# Patient Record
Sex: MALE | Race: OTHER | HISPANIC OR LATINO | URBAN - METROPOLITAN AREA
[De-identification: names, ages, dates, MRNs, and addresses within clinical notes are randomized per-mention and may not be internally consistent; named-entity substitution may affect disease eponyms.]

---

## 2020-12-30 ENCOUNTER — INPATIENT (INPATIENT)
Facility: HOSPITAL | Age: 35
LOS: 0 days | Discharge: HOME | DRG: 310 | End: 2020-12-31
Attending: INTERNAL MEDICINE | Admitting: INTERNAL MEDICINE
Payer: MEDICAID

## 2020-12-30 VITALS
DIASTOLIC BLOOD PRESSURE: 91 MMHG | HEIGHT: 67 IN | OXYGEN SATURATION: 100 % | HEART RATE: 138 BPM | RESPIRATION RATE: 18 BRPM | WEIGHT: 182.1 LBS | SYSTOLIC BLOOD PRESSURE: 143 MMHG

## 2020-12-30 DIAGNOSIS — I48.91 UNSPECIFIED ATRIAL FIBRILLATION: ICD-10-CM

## 2020-12-30 DIAGNOSIS — I10 ESSENTIAL (PRIMARY) HYPERTENSION: ICD-10-CM

## 2020-12-30 DIAGNOSIS — E11.9 TYPE 2 DIABETES MELLITUS WITHOUT COMPLICATIONS: ICD-10-CM

## 2020-12-30 LAB
ALBUMIN SERPL ELPH-MCNC: 4 G/DL — SIGNIFICANT CHANGE UP (ref 3.4–5)
ALP SERPL-CCNC: 150 U/L — HIGH (ref 40–120)
ALT FLD-CCNC: 61 U/L — HIGH (ref 12–42)
AMPHET UR-MCNC: NEGATIVE — SIGNIFICANT CHANGE UP
ANION GAP SERPL CALC-SCNC: 13 MMOL/L — SIGNIFICANT CHANGE UP (ref 9–16)
APTT BLD: 30.2 SEC — SIGNIFICANT CHANGE UP (ref 27.5–35.5)
AST SERPL-CCNC: 45 U/L — HIGH (ref 15–37)
BARBITURATES UR SCN-MCNC: NEGATIVE — SIGNIFICANT CHANGE UP
BASOPHILS # BLD AUTO: 0.06 K/UL — SIGNIFICANT CHANGE UP (ref 0–0.2)
BASOPHILS NFR BLD AUTO: 0.8 % — SIGNIFICANT CHANGE UP (ref 0–2)
BENZODIAZ UR-MCNC: NEGATIVE — SIGNIFICANT CHANGE UP
BILIRUB SERPL-MCNC: 0.2 MG/DL — SIGNIFICANT CHANGE UP (ref 0.2–1.2)
BUN SERPL-MCNC: 11 MG/DL — SIGNIFICANT CHANGE UP (ref 7–23)
CALCIUM SERPL-MCNC: 9.2 MG/DL — SIGNIFICANT CHANGE UP (ref 8.5–10.5)
CHLORIDE SERPL-SCNC: 107 MMOL/L — SIGNIFICANT CHANGE UP (ref 96–108)
CO2 SERPL-SCNC: 19 MMOL/L — LOW (ref 22–31)
COCAINE METAB.OTHER UR-MCNC: NEGATIVE — SIGNIFICANT CHANGE UP
CREAT SERPL-MCNC: 0.93 MG/DL — SIGNIFICANT CHANGE UP (ref 0.5–1.3)
EOSINOPHIL # BLD AUTO: 0.13 K/UL — SIGNIFICANT CHANGE UP (ref 0–0.5)
EOSINOPHIL NFR BLD AUTO: 1.7 % — SIGNIFICANT CHANGE UP (ref 0–6)
GLUCOSE BLDC GLUCOMTR-MCNC: 182 MG/DL — HIGH (ref 70–99)
GLUCOSE SERPL-MCNC: 307 MG/DL — HIGH (ref 70–99)
HCT VFR BLD CALC: 48.9 % — SIGNIFICANT CHANGE UP (ref 39–50)
HGB BLD-MCNC: 16.8 G/DL — SIGNIFICANT CHANGE UP (ref 13–17)
IMM GRANULOCYTES NFR BLD AUTO: 0.3 % — SIGNIFICANT CHANGE UP (ref 0–1.5)
INR BLD: 0.89 — SIGNIFICANT CHANGE UP (ref 0.88–1.16)
LYMPHOCYTES # BLD AUTO: 1.56 K/UL — SIGNIFICANT CHANGE UP (ref 1–3.3)
LYMPHOCYTES # BLD AUTO: 21 % — SIGNIFICANT CHANGE UP (ref 13–44)
MAGNESIUM SERPL-MCNC: 2 MG/DL — SIGNIFICANT CHANGE UP (ref 1.6–2.6)
MCHC RBC-ENTMCNC: 30.2 PG — SIGNIFICANT CHANGE UP (ref 27–34)
MCHC RBC-ENTMCNC: 34.4 GM/DL — SIGNIFICANT CHANGE UP (ref 32–36)
MCV RBC AUTO: 87.8 FL — SIGNIFICANT CHANGE UP (ref 80–100)
METHADONE UR-MCNC: NEGATIVE — SIGNIFICANT CHANGE UP
MONOCYTES # BLD AUTO: 0.42 K/UL — SIGNIFICANT CHANGE UP (ref 0–0.9)
MONOCYTES NFR BLD AUTO: 5.7 % — SIGNIFICANT CHANGE UP (ref 2–14)
NEUTROPHILS # BLD AUTO: 5.24 K/UL — SIGNIFICANT CHANGE UP (ref 1.8–7.4)
NEUTROPHILS NFR BLD AUTO: 70.5 % — SIGNIFICANT CHANGE UP (ref 43–77)
NRBC # BLD: 0 /100 WBCS — SIGNIFICANT CHANGE UP (ref 0–0)
NT-PROBNP SERPL-SCNC: 27 PG/ML — SIGNIFICANT CHANGE UP
OPIATES UR-MCNC: NEGATIVE — SIGNIFICANT CHANGE UP
PCP SPEC-MCNC: SIGNIFICANT CHANGE UP
PCP UR-MCNC: NEGATIVE — SIGNIFICANT CHANGE UP
PLATELET # BLD AUTO: 173 K/UL — SIGNIFICANT CHANGE UP (ref 150–400)
POTASSIUM SERPL-MCNC: 4.4 MMOL/L — SIGNIFICANT CHANGE UP (ref 3.5–5.3)
POTASSIUM SERPL-SCNC: 4.4 MMOL/L — SIGNIFICANT CHANGE UP (ref 3.5–5.3)
PROT SERPL-MCNC: 8.2 G/DL — SIGNIFICANT CHANGE UP (ref 6.4–8.2)
PROTHROM AB SERPL-ACNC: 10.7 SEC — SIGNIFICANT CHANGE UP (ref 10.6–13.6)
RBC # BLD: 5.57 M/UL — SIGNIFICANT CHANGE UP (ref 4.2–5.8)
RBC # FLD: 13.1 % — SIGNIFICANT CHANGE UP (ref 10.3–14.5)
SARS-COV-2 RNA SPEC QL NAA+PROBE: SIGNIFICANT CHANGE UP
SODIUM SERPL-SCNC: 139 MMOL/L — SIGNIFICANT CHANGE UP (ref 132–145)
THC UR QL: NEGATIVE — SIGNIFICANT CHANGE UP
TROPONIN I SERPL-MCNC: 0.02 NG/ML — SIGNIFICANT CHANGE UP (ref 0.02–0.06)
TSH SERPL-MCNC: 1.75 UIU/ML — SIGNIFICANT CHANGE UP (ref 0.36–3.74)
WBC # BLD: 7.43 K/UL — SIGNIFICANT CHANGE UP (ref 3.8–10.5)
WBC # FLD AUTO: 7.43 K/UL — SIGNIFICANT CHANGE UP (ref 3.8–10.5)

## 2020-12-30 PROCEDURE — 71045 X-RAY EXAM CHEST 1 VIEW: CPT | Mod: 26

## 2020-12-30 PROCEDURE — 99285 EMERGENCY DEPT VISIT HI MDM: CPT | Mod: 25

## 2020-12-30 RX ORDER — DILTIAZEM HCL 120 MG
10 CAPSULE, EXT RELEASE 24 HR ORAL ONCE
Refills: 0 | Status: COMPLETED | OUTPATIENT
Start: 2020-12-30 | End: 2020-12-30

## 2020-12-30 RX ORDER — DILTIAZEM HCL 120 MG
60 CAPSULE, EXT RELEASE 24 HR ORAL ONCE
Refills: 0 | Status: COMPLETED | OUTPATIENT
Start: 2020-12-30 | End: 2020-12-30

## 2020-12-30 RX ORDER — INSULIN LISPRO 100/ML
VIAL (ML) SUBCUTANEOUS
Refills: 0 | Status: DISCONTINUED | OUTPATIENT
Start: 2020-12-30 | End: 2020-12-31

## 2020-12-30 RX ORDER — APIXABAN 2.5 MG/1
5 TABLET, FILM COATED ORAL EVERY 12 HOURS
Refills: 0 | Status: DISCONTINUED | OUTPATIENT
Start: 2020-12-31 | End: 2020-12-31

## 2020-12-30 RX ORDER — GLUCAGON INJECTION, SOLUTION 0.5 MG/.1ML
1 INJECTION, SOLUTION SUBCUTANEOUS ONCE
Refills: 0 | Status: DISCONTINUED | OUTPATIENT
Start: 2020-12-30 | End: 2020-12-31

## 2020-12-30 RX ORDER — DEXTROSE 50 % IN WATER 50 %
25 SYRINGE (ML) INTRAVENOUS ONCE
Refills: 0 | Status: DISCONTINUED | OUTPATIENT
Start: 2020-12-30 | End: 2020-12-31

## 2020-12-30 RX ORDER — APIXABAN 2.5 MG/1
5 TABLET, FILM COATED ORAL ONCE
Refills: 0 | Status: COMPLETED | OUTPATIENT
Start: 2020-12-30 | End: 2020-12-30

## 2020-12-30 RX ORDER — DEXTROSE 50 % IN WATER 50 %
12.5 SYRINGE (ML) INTRAVENOUS ONCE
Refills: 0 | Status: DISCONTINUED | OUTPATIENT
Start: 2020-12-30 | End: 2020-12-31

## 2020-12-30 RX ORDER — DILTIAZEM HCL 120 MG
20 CAPSULE, EXT RELEASE 24 HR ORAL ONCE
Refills: 0 | Status: COMPLETED | OUTPATIENT
Start: 2020-12-30 | End: 2020-12-30

## 2020-12-30 RX ORDER — SODIUM CHLORIDE 9 MG/ML
1000 INJECTION, SOLUTION INTRAVENOUS
Refills: 0 | Status: DISCONTINUED | OUTPATIENT
Start: 2020-12-30 | End: 2020-12-31

## 2020-12-30 RX ORDER — DILTIAZEM HCL 120 MG
5 CAPSULE, EXT RELEASE 24 HR ORAL
Qty: 125 | Refills: 0 | Status: DISCONTINUED | OUTPATIENT
Start: 2020-12-30 | End: 2020-12-30

## 2020-12-30 RX ORDER — DEXTROSE 50 % IN WATER 50 %
15 SYRINGE (ML) INTRAVENOUS ONCE
Refills: 0 | Status: DISCONTINUED | OUTPATIENT
Start: 2020-12-30 | End: 2020-12-31

## 2020-12-30 RX ORDER — METOPROLOL TARTRATE 50 MG
25 TABLET ORAL EVERY 6 HOURS
Refills: 0 | Status: DISCONTINUED | OUTPATIENT
Start: 2020-12-30 | End: 2020-12-31

## 2020-12-30 RX ORDER — INFLUENZA VIRUS VACCINE 15; 15; 15; 15 UG/.5ML; UG/.5ML; UG/.5ML; UG/.5ML
0.5 SUSPENSION INTRAMUSCULAR ONCE
Refills: 0 | Status: DISCONTINUED | OUTPATIENT
Start: 2020-12-30 | End: 2020-12-31

## 2020-12-30 RX ORDER — METOPROLOL TARTRATE 50 MG
5 TABLET ORAL ONCE
Refills: 0 | Status: COMPLETED | OUTPATIENT
Start: 2020-12-30 | End: 2020-12-30

## 2020-12-30 RX ADMIN — Medication 20 MILLIGRAM(S): at 16:20

## 2020-12-30 RX ADMIN — Medication 25 MILLIGRAM(S): at 20:29

## 2020-12-30 RX ADMIN — APIXABAN 5 MILLIGRAM(S): 2.5 TABLET, FILM COATED ORAL at 18:08

## 2020-12-30 RX ADMIN — Medication 10 MILLIGRAM(S): at 17:52

## 2020-12-30 RX ADMIN — Medication 5 MILLIGRAM(S): at 20:00

## 2020-12-30 RX ADMIN — Medication 60 MILLIGRAM(S): at 16:21

## 2020-12-30 RX ADMIN — Medication 5 MG/HR: at 18:03

## 2020-12-30 RX ADMIN — Medication 2: at 22:21

## 2020-12-30 NOTE — H&P ADULT - HISTORY OF PRESENT ILLNESS
Patient is a 36 y/o male with PMHx of paroxysmal a-fib (on warfarin, last dose 2 weeks ago), HTN, and DM II who presented to [facility] ED 12/30/2020 c/o palpitations and CP. He states symptoms started 40 minutes prior to arrival to ED. He reports that symptoms occurred acutely, and were similar to prior a-fib episodes. He explains that palpitations are associated with upper left sided chest pressure radiating to left shoulder. Since palpitations started patient reports dizziness, lightheadedness, and generalized weakness. When symptoms did not resolve he called EMS, and EMS found patient in rapid atrial fibrillation, and was given PO aspirin 162mg x1 and diltiazem 21mg POx1. Per ED attending report that HR improved from 160-140s. In addition, patient complains of worsening SOB which occurs at rest, and is worsened with exertion. In ED VS /9, , RR 18, O2 100%, EKG atrial fibrillation @ 129, no ST changes, CXR unremarkable, Labs trops x1 negative, COVID negative, INR 0.89, TSH WNL. In ED patient given Eliquis 5mg POx1, Diltiazem 60mg POx1, IV Diltiazem 20mg x1, and IV Diltiazem 10mg x1, with HR only improving to 130s, and subsequently started on diltiazem gtt.     Patient is now admitted to cardiology service for management of rapid atrial fibrillation.    Hx obtained via  [place] [id number]    Patient is a 34 y/o Yor[place] SPEAKING male with PMHx of paroxysmal a-fib (on warfarin, last dose 2 weeks ago), HTN, and DM II who presented to [facility] ED 12/30/2020 c/o palpitations and CP. He states symptoms started 40 minutes prior to arrival to ED. He reports that symptoms occurred acutely, and were similar to prior a-fib episodes. He explains that palpitations are associated with upper left sided chest pressure radiating to left shoulder. Since palpitations started patient reports dizziness, lightheadedness, and generalized weakness. When symptoms did not resolve he called EMS, and EMS found patient in rapid atrial fibrillation, and was given PO aspirin 162mg x1 and diltiazem 21mg POx1. Per ED attending report that HR improved from 160-140s. In addition, patient complains of worsening SOB which occurs at rest, and is worsened with exertion. In ED VS /9, , RR 18, O2 100%, EKG atrial fibrillation @ 129, no ST changes, CXR unremarkable, Labs trops x1 negative, COVID negative, INR 0.89, TSH WNL. In ED patient given Eliquis 5mg POx1, Diltiazem 60mg POx1, IV Diltiazem 20mg x1, and IV Diltiazem 10mg x1, with HR only improving to 130s, and subsequently started on diltiazem gtt.     Patient is now admitted to cardiology service for management of rapid atrial fibrillation.

## 2020-12-30 NOTE — H&P ADULT - ATTENDING COMMENTS
Initial attending contact date 12/31 on morning bedside rounds. See PA note written above, for details. I reviewed the PA documentation.  I have personally seen and examined this patient today. I reviewed vitals, labs, medications, cardiac studies and additional imaging.  I agree with the PA's findings and plans as written above with the following additions/amendments:  Patient with persistent rapid Afib   NPO for PORSHA/DCCV and EP c/s  Cont BB and Eliquis  TTE also ordered  Anticipate home 12/31 if patient has escort  Home 1/1 if patient without escort  AMARILYS Weir.  Cardiology Attending

## 2020-12-30 NOTE — ED PROVIDER NOTE - OBJECTIVE STATEMENT
Pt is a 36yo M with a h/o AF (on warfarin), HTN, and NIDDM who p/w palpitations and chest pressure ~40 minutes PTA.  Pt reports acute onset and felt similar to episodes of AF.  Denies any inciting factors.  Reports taking all of his medications today (warfarin, lisinopril, carvedilol, metformin, and vitamins).  Palpitations associated with upper L chest pain/pressure - radiates to shoulder.  Pt called EMS and EMS found pt in rapid AF.  They gave 162mg of aspirin and 21mg of diltiazem.  They report HR came down from 160s to 140s.  Currently in 130s upon arrival. Pt is a 36yo M with a h/o PAF (on warfarin), HTN, and NIDDM who p/w palpitations and chest pressure ~40 minutes PTA.  Pt reports acute onset and felt similar to episodes of AF.  Denies any inciting factors.  Reports taking all of his medications today (warfarin, lisinopril, carvedilol, metformin, and vitamins).  Palpitations associated with upper L chest pain/pressure - radiates to shoulder.  Pt called EMS and EMS found pt in rapid AF.  They gave 162mg of aspirin and 21mg of diltiazem.  They report HR came down from 160s to 140s.  Currently in 130s upon arrival.  Pt reports feeling SOB even at rest, worse with exertion.  Also feeling dizzy and describes as light headed.  Pt also reports generalized weakness since the palpitations started.    +EtOH use but reports moderate and only on weekends.  Denies any drug use or tobacco use.

## 2020-12-30 NOTE — ED ADULT TRIAGE NOTE - CHIEF COMPLAINT QUOTE
Pt BIBEMS complaining of chest pain. Pt found to be in rapid afib by EMS. Pt received 21mg of Cardizem and 162 of aspirin by EMS.

## 2020-12-30 NOTE — ED PROVIDER NOTE - PROGRESS NOTE DETAILS
I discussed the INR results with patient and he admits he forgets to take his warfarin and in fact ahsn;t taken it in 2 weeks. I discussed case with out cardiologist, Dr. Joseph.  He recommends continuing treatment with Diltiazem and giving additional bolus of 10mg and starting a drip.  He also recommends admission given pt's sxs and need for possible heparin. DR. Hunter of cardiology at [facility] paged via CTC to discuss case. DR. Hunter of cardiology at [facility] paged via CTC to discuss case.  Dr. Hunter accepts pt to cardiac tele and would like to start eliquis and keep pt NPO after midnight for possible cardioversion in AM.

## 2020-12-30 NOTE — ED PROVIDER NOTE - CLINICAL SUMMARY MEDICAL DECISION MAKING FREE TEXT BOX
VERONICA.  IV, labs, monitor.  Will give additional dose of Cardizem both IV and PO.  Will consult cardiology as needed.

## 2020-12-30 NOTE — ED ADULT TRIAGE NOTE - NS ED TRIAGE CLINICAL UPGRADE
I have placed referral for diabetic educator and nutrition therapy   
Patient would like a return call by Diabetic Educator.  Please call patient at [phone].  
Spoke with patient.  Patient is wondering if he could get the [employer] personal CGM.  Explained the basics of the [employer] Personal CGM, patient is very interested.  Will discuss with patient how to get this system at appointment on Tuesday March 6th.  Patient also wondering if he could get an appointment sooner than his current scheduled appointment with diabetes ed on March 6th at 11:00 am.  Patient would like to come to [place] or [place].  Explained that these clinics don't have any openings before next Tuesday, offered to look at other clinics, but patient declined.      All questions/concerns answered.    Hafsa Spring, KADEN, LD    
Deteriorating patient status - Patient was clinically upgraded due to deteriorating patient status.

## 2020-12-30 NOTE — ED PROVIDER NOTE - NS_EDPROVIDERDISPOUSERTYPE_ED_A_ED
Attending Attestation (For Attendings USE Only)... Retention Suture Text: Retention sutures were placed to support the closure and prevent dehiscence.

## 2020-12-30 NOTE — ED ADULT NURSE NOTE - NSIMPLEMENTINTERV_GEN_ALL_ED
Implemented All Universal Safety Interventions:  [place] to call system. Call bell, personal items and telephone within reach. Instruct patient to call for assistance. Room bathroom lighting operational. Non-slip footwear when patient is off stretcher. Physically safe environment: no spills, clutter or unnecessary equipment. Stretcher in lowest position, wheels locked, appropriate side rails in place.

## 2020-12-30 NOTE — H&P ADULT - ASSESSMENT
Patient is a 34 y/o male with PMHx of paroxysmal a-fib (on warfarin, last dose 2 weeks ago), HTN, and DM II who presented to [facility] ED 12/30/2020 c/o palpitations and CP. He is now admitted to cardiology service for management of rapid atrial fibrillation.

## 2020-12-30 NOTE — H&P ADULT - NSHPLABSRESULTS_GEN_ALL_CORE
EKG: atrial fibrillation @ 129bpm, no ST changes                          16.8   7.43  )-----------( 173      ( 30 Dec 2020 16:25 )             48.9       12-30    139  |  107  |  11  ----------------------------<  307<H>  4.4   |  19<L>  |  0.93    Ca    9.2      30 Dec 2020 16:25  Mg     2.0     12-30    TPro  8.2  /  Alb  4.0  /  TBili  0.2  /  DBili  x   /  AST  45<H>  /  ALT  61<H>  /  AlkPhos  150<H>  12-30      PT/INR - ( 30 Dec 2020 16:25 )   PT: 10.7 sec;   INR: 0.89          PTT - ( 30 Dec 2020 16:25 )  PTT:30.2 sec

## 2020-12-30 NOTE — ED ADULT NURSE REASSESSMENT NOTE - NS ED NURSE REASSESS COMMENT FT1
Pt complaining of continued chest palpitations. Pt remains on continuos cardiac monitoring. Pt remains in afib with hr ranging from . COVID test sent to lab. Will continue to monitor.

## 2020-12-30 NOTE — H&P ADULT - PROBLEM SELECTOR PLAN 1
-Currently 's, EKG revealed atrial fibrillation @ 129bpm, no ischemic changes  -CXR unremarkable, TSH WNL, Trops x1 negative  -In ED given Eliquis 5mg POx1, Diltiazem 60mg POx1, IV Diltiazem 20mg x1, and IV Diltiazem 10mg x1, with HR only improving to 130s, and subsequently started on diltiazem gtt  -Patient reports noncompliance to warfarin at home. Last taken 2 weeks ago, INR subtherapeutic. Started on Eliquis 5mg BID   -continue diltiazem gtt  -Echo ordered  -NPO after MN for PORSHA/DCCV, consult EP in AM -Currently 's, EKG revealed atrial fibrillation @ 129bpm, no ischemic changes  -CXR unremarkable, TSH WNL, Trops x1 negative  -In ED given Eliquis 5mg POx1, Diltiazem 60mg POx1, IV Diltiazem 20mg x1, and IV Diltiazem 10mg x1, with HR only improving to 130s, and subsequently started on diltiazem gtt  -Patient reports noncompliance to warfarin at home. Last taken 2 weeks ago, INR subtherapeutic. Started on Eliquis 5mg BID   -continue diltiazem gtt  -Echo ordered  -NPO after MN for PORSHA/DCCV, consult EP in AM  -UTOX negative -Currently 's, EKG revealed atrial fibrillation @ 129bpm, no ischemic changes  -CXR unremarkable, TSH WNL, Trops x1 negative  -In ED given Eliquis 5mg POx1, Diltiazem 60mg POx1, IV Diltiazem 20mg x1, and IV Diltiazem 10mg x1, with HR only improving to 130s, and subsequently started on diltiazem gtt  -Patient reports noncompliance to warfarin at home. Last taken 2 weeks ago, INR subtherapeutic. Started on Eliquis 5mg BID   -continue diltiazem gtt  -Echo ordered  -NPO after MN for PORSHA/DCCV, consult EP in AM  -UTOX negative  -monitor on tele -Currently 's, EKG revealed atrial fibrillation @ 129bpm, no ischemic changes  -CXR unremarkable, TSH WNL, Trops x1 negative  -In ED given Eliquis 5mg POx1, Diltiazem 60mg POx1, IV Diltiazem 20mg x1, and IV Diltiazem 10mg x1, with HR only improving to 130s, and subsequently started on diltiazem gtt  -Patient reports noncompliance to warfarin at home. Last taken 2 weeks ago, INR subtherapeutic. Started on Eliquis 5mg BID   -diltiazem gtt discontinued and started on Lopressor 25mg PO q6h  -Echo ordered  -NPO after MN for PORSHA/DCCV, consult EP in AM  -UTOX negative  -monitor on tele

## 2020-12-30 NOTE — ED ADULT NURSE NOTE - OBJECTIVE STATEMENT
Pt is a 35y male complaining of sudden of chest pain and palpitations radiating to left side. Pt was found to be in rapid afib by EMS and was given Cardizem 21mg and 162 of aspirin prior to arrival. Pt denies sob.

## 2020-12-30 NOTE — ED PROVIDER NOTE - PHYSICAL EXAMINATION
VITAL SIGNS: I have reviewed nursing notes and confirm.  CONSTITUTIONAL: Well-developed; well-nourished; in no acute distress.  SKIN: Skin is warm and dry, no acute rash.  HEAD: Normocephalic; atraumatic.  EYES: PERRL, EOM intact; conjunctiva and sclera clear.  ENT: No nasal discharge; airway clear.  NECK: Supple; non tender.  CARD: +Irregularly irregular tachycardia.  No murmurs.   RESP: No wheezes, rales or rhonchi.  ABD: Normal bowel sounds; soft; non-distended; non-tender; no hepatosplenomegaly.  MSK: Normal ROM. No clubbing, cyanosis or edema.  NEURO: Alert, oriented. Grossly unremarkable.  PSYCH: Cooperative, appropriate. VITAL SIGNS: I have reviewed nursing notes and confirm.  CONSTITUTIONAL: Well-developed; well-nourished; appears uncomfortable.   SKIN: Skin is warm and dry, no acute rash.  HEAD: Normocephalic; atraumatic.  EYES: PERRL, EOM intact; conjunctiva and sclera clear.  ENT: No nasal discharge; airway clear.  NECK: Supple; non tender.  CARD: +Irregularly irregular tachycardia.  No murmurs.   RESP: No wheezes, rales or rhonchi.  ABD: Normal bowel sounds; soft; non-distended; non-tender; no hepatosplenomegaly.  MSK: Normal ROM. No clubbing, cyanosis or edema.  NEURO: Alert, oriented. Grossly unremarkable.  PSYCH: Cooperative, appropriate.

## 2020-12-31 ENCOUNTER — TRANSCRIPTION ENCOUNTER (OUTPATIENT)
Age: 35
End: 2020-12-31

## 2020-12-31 VITALS
DIASTOLIC BLOOD PRESSURE: 78 MMHG | HEART RATE: 89 BPM | OXYGEN SATURATION: 99 % | SYSTOLIC BLOOD PRESSURE: 111 MMHG | RESPIRATION RATE: 18 BRPM

## 2020-12-31 LAB
A1C WITH ESTIMATED AVERAGE GLUCOSE RESULT: 7.9 % — HIGH (ref 4–5.6)
ALBUMIN SERPL ELPH-MCNC: 4.2 G/DL — SIGNIFICANT CHANGE UP (ref 3.3–5)
ALP SERPL-CCNC: 100 U/L — SIGNIFICANT CHANGE UP (ref 40–120)
ALT FLD-CCNC: 38 U/L — SIGNIFICANT CHANGE UP (ref 10–45)
ANION GAP SERPL CALC-SCNC: 13 MMOL/L — SIGNIFICANT CHANGE UP (ref 5–17)
AST SERPL-CCNC: 27 U/L — SIGNIFICANT CHANGE UP (ref 10–40)
BASOPHILS # BLD AUTO: 0.09 K/UL — SIGNIFICANT CHANGE UP (ref 0–0.2)
BASOPHILS NFR BLD AUTO: 0.9 % — SIGNIFICANT CHANGE UP (ref 0–2)
BILIRUB SERPL-MCNC: 0.6 MG/DL — SIGNIFICANT CHANGE UP (ref 0.2–1.2)
BUN SERPL-MCNC: 13 MG/DL — SIGNIFICANT CHANGE UP (ref 7–23)
CALCIUM SERPL-MCNC: 9.4 MG/DL — SIGNIFICANT CHANGE UP (ref 8.4–10.5)
CHLORIDE SERPL-SCNC: 105 MMOL/L — SIGNIFICANT CHANGE UP (ref 96–108)
CHOLEST SERPL-MCNC: 219 MG/DL — HIGH
CO2 SERPL-SCNC: 22 MMOL/L — SIGNIFICANT CHANGE UP (ref 22–31)
CREAT SERPL-MCNC: 0.61 MG/DL — SIGNIFICANT CHANGE UP (ref 0.5–1.3)
EOSINOPHIL # BLD AUTO: 0.17 K/UL — SIGNIFICANT CHANGE UP (ref 0–0.5)
EOSINOPHIL NFR BLD AUTO: 1.7 % — SIGNIFICANT CHANGE UP (ref 0–6)
ESTIMATED AVERAGE GLUCOSE: 180 MG/DL — HIGH (ref 68–114)
GLUCOSE BLDC GLUCOMTR-MCNC: 161 MG/DL — HIGH (ref 70–99)
GLUCOSE BLDC GLUCOMTR-MCNC: 167 MG/DL — HIGH (ref 70–99)
GLUCOSE BLDC GLUCOMTR-MCNC: 172 MG/DL — HIGH (ref 70–99)
GLUCOSE SERPL-MCNC: 187 MG/DL — HIGH (ref 70–99)
HCT VFR BLD CALC: 52.1 % — HIGH (ref 39–50)
HDLC SERPL-MCNC: 46 MG/DL — SIGNIFICANT CHANGE UP
HGB BLD-MCNC: 17.2 G/DL — HIGH (ref 13–17)
IMM GRANULOCYTES NFR BLD AUTO: 0.3 % — SIGNIFICANT CHANGE UP (ref 0–1.5)
LIPID PNL WITH DIRECT LDL SERPL: 131 MG/DL — HIGH
LYMPHOCYTES # BLD AUTO: 3.12 K/UL — SIGNIFICANT CHANGE UP (ref 1–3.3)
LYMPHOCYTES # BLD AUTO: 32 % — SIGNIFICANT CHANGE UP (ref 13–44)
MAGNESIUM SERPL-MCNC: 2.1 MG/DL — SIGNIFICANT CHANGE UP (ref 1.6–2.6)
MCHC RBC-ENTMCNC: 29.1 PG — SIGNIFICANT CHANGE UP (ref 27–34)
MCHC RBC-ENTMCNC: 33 GM/DL — SIGNIFICANT CHANGE UP (ref 32–36)
MCV RBC AUTO: 88 FL — SIGNIFICANT CHANGE UP (ref 80–100)
MONOCYTES # BLD AUTO: 0.75 K/UL — SIGNIFICANT CHANGE UP (ref 0–0.9)
MONOCYTES NFR BLD AUTO: 7.7 % — SIGNIFICANT CHANGE UP (ref 2–14)
NEUTROPHILS # BLD AUTO: 5.59 K/UL — SIGNIFICANT CHANGE UP (ref 1.8–7.4)
NEUTROPHILS NFR BLD AUTO: 57.4 % — SIGNIFICANT CHANGE UP (ref 43–77)
NON HDL CHOLESTEROL: 173 MG/DL — HIGH
NRBC # BLD: 0 /100 WBCS — SIGNIFICANT CHANGE UP (ref 0–0)
PLATELET # BLD AUTO: 181 K/UL — SIGNIFICANT CHANGE UP (ref 150–400)
POTASSIUM SERPL-MCNC: 3.3 MMOL/L — LOW (ref 3.5–5.3)
POTASSIUM SERPL-SCNC: 3.3 MMOL/L — LOW (ref 3.5–5.3)
PROT SERPL-MCNC: 7 G/DL — SIGNIFICANT CHANGE UP (ref 6–8.3)
RBC # BLD: 5.92 M/UL — HIGH (ref 4.2–5.8)
RBC # FLD: 13.2 % — SIGNIFICANT CHANGE UP (ref 10.3–14.5)
SODIUM SERPL-SCNC: 140 MMOL/L — SIGNIFICANT CHANGE UP (ref 135–145)
TRIGL SERPL-MCNC: 210 MG/DL — HIGH
WBC # BLD: 9.75 K/UL — SIGNIFICANT CHANGE UP (ref 3.8–10.5)
WBC # FLD AUTO: 9.75 K/UL — SIGNIFICANT CHANGE UP (ref 3.8–10.5)

## 2020-12-31 PROCEDURE — 93312 ECHO TRANSESOPHAGEAL: CPT | Mod: 26

## 2020-12-31 PROCEDURE — 84443 ASSAY THYROID STIM HORMONE: CPT

## 2020-12-31 PROCEDURE — 99239 HOSP IP/OBS DSCHRG MGMT >30: CPT

## 2020-12-31 PROCEDURE — 82962 GLUCOSE BLOOD TEST: CPT

## 2020-12-31 PROCEDURE — 85025 COMPLETE CBC W/AUTO DIFF WBC: CPT

## 2020-12-31 PROCEDURE — 87635 SARS-COV-2 COVID-19 AMP PRB: CPT

## 2020-12-31 PROCEDURE — 83036 HEMOGLOBIN GLYCOSYLATED A1C: CPT

## 2020-12-31 PROCEDURE — 86769 SARS-COV-2 COVID-19 ANTIBODY: CPT

## 2020-12-31 PROCEDURE — 85730 THROMBOPLASTIN TIME PARTIAL: CPT

## 2020-12-31 PROCEDURE — 93306 TTE W/DOPPLER COMPLETE: CPT | Mod: 26

## 2020-12-31 PROCEDURE — 36415 COLL VENOUS BLD VENIPUNCTURE: CPT

## 2020-12-31 PROCEDURE — 80307 DRUG TEST PRSMV CHEM ANLYZR: CPT

## 2020-12-31 PROCEDURE — 92960 CARDIOVERSION ELECTRIC EXT: CPT

## 2020-12-31 PROCEDURE — 96374 THER/PROPH/DIAG INJ IV PUSH: CPT

## 2020-12-31 PROCEDURE — 93306 TTE W/DOPPLER COMPLETE: CPT

## 2020-12-31 PROCEDURE — 85610 PROTHROMBIN TIME: CPT

## 2020-12-31 PROCEDURE — 99285 EMERGENCY DEPT VISIT HI MDM: CPT | Mod: 25

## 2020-12-31 PROCEDURE — 96376 TX/PRO/DX INJ SAME DRUG ADON: CPT

## 2020-12-31 PROCEDURE — 93312 ECHO TRANSESOPHAGEAL: CPT

## 2020-12-31 PROCEDURE — 99255 IP/OBS CONSLTJ NEW/EST HI 80: CPT

## 2020-12-31 PROCEDURE — 84484 ASSAY OF TROPONIN QUANT: CPT

## 2020-12-31 PROCEDURE — 80061 LIPID PANEL: CPT

## 2020-12-31 PROCEDURE — 83735 ASSAY OF MAGNESIUM: CPT

## 2020-12-31 PROCEDURE — 83880 ASSAY OF NATRIURETIC PEPTIDE: CPT

## 2020-12-31 PROCEDURE — 80053 COMPREHEN METABOLIC PANEL: CPT

## 2020-12-31 PROCEDURE — 93321 DOPPLER ECHO F-UP/LMTD STD: CPT | Mod: 26,59

## 2020-12-31 PROCEDURE — 71045 X-RAY EXAM CHEST 1 VIEW: CPT

## 2020-12-31 RX ORDER — METOPROLOL TARTRATE 50 MG
1 TABLET ORAL
Qty: 30 | Refills: 2
Start: 2020-12-31 | End: 2021-03-30

## 2020-12-31 RX ORDER — APIXABAN 2.5 MG/1
1 TABLET, FILM COATED ORAL
Qty: 60 | Refills: 2
Start: 2020-12-31 | End: 2021-03-30

## 2020-12-31 RX ORDER — ATORVASTATIN CALCIUM 80 MG/1
40 TABLET, FILM COATED ORAL AT BEDTIME
Refills: 0 | Status: DISCONTINUED | OUTPATIENT
Start: 2020-12-31 | End: 2020-12-31

## 2020-12-31 RX ORDER — METOPROLOL TARTRATE 50 MG
5 TABLET ORAL ONCE
Refills: 0 | Status: COMPLETED | OUTPATIENT
Start: 2020-12-31 | End: 2020-12-31

## 2020-12-31 RX ORDER — POTASSIUM CHLORIDE 20 MEQ
40 PACKET (EA) ORAL ONCE
Refills: 0 | Status: COMPLETED | OUTPATIENT
Start: 2020-12-31 | End: 2020-12-31

## 2020-12-31 RX ORDER — METOPROLOL TARTRATE 50 MG
1 TABLET ORAL
Qty: 7 | Refills: 0
Start: 2020-12-31 | End: 2021-01-06

## 2020-12-31 RX ORDER — ATORVASTATIN CALCIUM 80 MG/1
1 TABLET, FILM COATED ORAL
Qty: 7 | Refills: 0
Start: 2020-12-31 | End: 2021-01-06

## 2020-12-31 RX ORDER — ATORVASTATIN CALCIUM 80 MG/1
1 TABLET, FILM COATED ORAL
Qty: 30 | Refills: 2
Start: 2020-12-31 | End: 2021-03-30

## 2020-12-31 RX ORDER — POTASSIUM CHLORIDE 20 MEQ
20 PACKET (EA) ORAL ONCE
Refills: 0 | Status: COMPLETED | OUTPATIENT
Start: 2020-12-31 | End: 2020-12-31

## 2020-12-31 RX ORDER — CARVEDILOL PHOSPHATE 80 MG/1
1 CAPSULE, EXTENDED RELEASE ORAL
Qty: 0 | Refills: 0 | DISCHARGE

## 2020-12-31 RX ORDER — APIXABAN 2.5 MG/1
1 TABLET, FILM COATED ORAL
Qty: 60 | Refills: 0
Start: 2020-12-31 | End: 2021-01-29

## 2020-12-31 RX ORDER — WARFARIN SODIUM 2.5 MG/1
0 TABLET ORAL
Qty: 0 | Refills: 0 | DISCHARGE

## 2020-12-31 RX ADMIN — Medication 5 MILLIGRAM(S): at 02:34

## 2020-12-31 RX ADMIN — Medication 40 MILLIEQUIVALENT(S): at 08:23

## 2020-12-31 RX ADMIN — Medication 2: at 07:35

## 2020-12-31 RX ADMIN — Medication 25 MILLIGRAM(S): at 05:21

## 2020-12-31 RX ADMIN — APIXABAN 5 MILLIGRAM(S): 2.5 TABLET, FILM COATED ORAL at 05:22

## 2020-12-31 RX ADMIN — Medication 20 MILLIEQUIVALENT(S): at 08:23

## 2020-12-31 RX ADMIN — Medication 25 MILLIGRAM(S): at 01:57

## 2020-12-31 RX ADMIN — Medication 5 MILLIGRAM(S): at 05:33

## 2020-12-31 NOTE — CONSULT NOTE ADULT - SUBJECTIVE AND OBJECTIVE BOX
CHIEF COMPLAINT: Palpitations    HISTORY OF PRESENT ILLNESS:    34 y/o Stateless SPEAKING male with PMHx of paroxysmal a-fib (on warfarin, last dose 2 weeks ago), HTN, and DM II who presented to [facility] ED 12/30/2020 c/o palpitations and CP. He states symptoms started 40 minutes prior to arrival to ED. He reports that symptoms occurred acutely, and were similar to prior a-fib episodes. He explains that palpitations are associated with upper left sided chest pressure radiating to left shoulder. Since palpitations started patient reports dizziness, lightheadedness, and generalized weakness. When symptoms did not resolve he called EMS, and EMS found patient in rapid atrial fibrillation, and was given PO aspirin 162mg x1 and diltiazem 21mg POx1. Per ED attending report that HR improved from 160-140s. In addition, patient complains of worsening SOB which occurs at rest, and is worsened with exertion. In ED VS /9, , RR 18, O2 100%, EKG atrial fibrillation @ 129, no ST changes, CXR unremarkable, Labs trops x1 negative, COVID negative, INR 0.89, TSH WNL. In ED patient given Eliquis 5mg POx1, Diltiazem 60mg POx1, IV Diltiazem 20mg x1, and IV Diltiazem 10mg x1, with HR only improving to 130s, and subsequently started on diltiazem gtt.    Patient reports he was diagnosed with atrial fibrillation around the same time last year, s/p DCCV at OSH.  He has been maintained on Warfarin since that time, but reports that sometimes his "blood is too thick".  He denies any episodes of palpitations until yesterday when he had sudden onset rapid heart action and chest pressure.  He remains in atrial fibrillation with -150s at rest.  He has been NPO since midnight awaiting PORSHA/DCCV.      Of note, he endorses having a history of snoring but has not had a formal sleep study.      PAST MEDICAL & SURGICAL HISTORY:  Diabetes mellitus    Atrial fibrillation    Essential hypertension    No significant past surgical history          PERTINENT DIAGNOSTIC TESTING:    Echo pending      Allergies    No Known Allergies    Intolerances    	    MEDICATIONS:  metoprolol tartrate 25 milliGRAM(s) Oral every 6 hours  dextrose 40% Gel 15 Gram(s) Oral once  dextrose 50% Injectable 25 Gram(s) IV Push once  dextrose 50% Injectable 12.5 Gram(s) IV Push once  dextrose 50% Injectable 25 Gram(s) IV Push once  glucagon  Injectable 1 milliGRAM(s) IntraMuscular once  insulin lispro (ADMELOG) corrective regimen sliding scale   SubCutaneous Before meals and at bedtime  apixaban 5 milliGRAM(s) Oral every 12 hours  dextrose 5%. 1000 milliLiter(s) IV Continuous <Continuous>  dextrose 5%. 1000 milliLiter(s) IV Continuous <Continuous>  influenza   Vaccine 0.5 milliLiter(s) IntraMuscular once      FAMILY HISTORY:  No pertinent family history in first degree relatives        SOCIAL HISTORY:    [x ] Non-smoker  [ ] Smoker  [ ] Alcohol - occasional ETOH use        REVIEW OF SYSTEMS:    CONSTITUTIONAL: No fever, weight loss, or fatigue  EYES: No eye pain, visual disturbances, or discharge  ENMT:  No difficulty hearing, tinnitus, vertigo; No sinus or throat pain  NECK: No pain or stiffness  BREASTS: No pain, masses, or nipple discharge  RESPIRATORY: No cough, wheezing, chills or hemoptysis; No Shortness of Breath  CARDIOVASCULAR: + palpitations, chest pressure  GASTROINTESTINAL: No abdominal or epigastric pain. No nausea, vomiting, or hematemesis; No diarrhea or constipation. No melena or hematochezia.  GENITOURINARY: No dysuria, frequency, hematuria, or incontinence  NEUROLOGICAL: No headaches, memory loss, loss of strength, numbness, or tremors  SKIN: No itching, burning, rashes, or lesions   LYMPH Nodes: No enlarged glands  ENDOCRINE: No heat or cold intolerance; No hair loss  MUSCULOSKELETAL: No joint pain or swelling; No muscle, back, or extremity pain  PSYCHIATRIC: No depression, anxiety, mood swings, or difficulty sleeping  HEME/LYMPH: No easy bruising, or bleeding gums  ALLERY AND IMMUNOLOGIC: No hives or eczema	    [x ] All others negative	  [ ] Unable to obtain    PHYSICAL EXAM:  T(C): 36.4 (12-31-20 @ 10:12), Max: 37.1 (12-30-20 @ 22:23)  HR: 154 (12-31-20 @ 08:22) (89 - 157)  BP: 112/67 (12-31-20 @ 08:22) (107/68 - 160/118)  RR: 18 (12-31-20 @ 08:22) (18 - 18)  SpO2: 99% (12-31-20 @ 08:22) (95% - 100%)  Wt(kg): --  I&O's Summary    30 Dec 2020 07:01  -  31 Dec 2020 07:00  --------------------------------------------------------  IN: 100 mL / OUT: 350 mL / NET: -250 mL    31 Dec 2020 07:01  -  31 Dec 2020 10:16  --------------------------------------------------------  IN: 0 mL / OUT: 0 mL / NET: 0 mL        TELEMETRY: 	  AFib with -150s  ECG:     Appearance: Normal	  HEENT:   Normal oral mucosa, PERRL, EOMI	  Cardiovascular: s1s2 irreg irreg tachy  Respiratory: Lungs clear to auscultation	  Gastrointestinal:  Soft, Non-tender, + BS	  Neurologic: A&O x 3, Non-focal  Extremities: Normal range of motion, No clubbing, cyanosis or edema  Vascular: Peripheral pulses palpable 2+ bilaterally    	  LABS:	 	    CARDIAC MARKERS:  Troponin I, Serum: 0.022 ng/mL (12-30 @ 16:25)                                  17.2   9.75  )-----------( 181      ( 31 Dec 2020 06:33 )             52.1     12-31    140  |  105  |  13  ----------------------------<  187<H>  3.3<L>   |  22  |  0.61    Ca    9.4      31 Dec 2020 06:33  Mg     2.1     12-31    TPro  7.0  /  Alb  4.2  /  TBili  0.6  /  DBili  x   /  AST  27  /  ALT  38  /  AlkPhos  100  12-31    proBNP: Serum Pro-Brain Natriuretic Peptide: 27 pg/mL (12-30 @ 16:25)    Lipid Profile:   HgA1c:   TSH: Thyroid Stimulating Hormone, Serum: 1.752 uIU/mL (12-30 @ 16:25)      ASSESSMENT/PLAN: 	  34 y/o M with h/o DM, HTN and symptomatic atrial fibrillation with RVR.  Patient remains in rapid AFib despite attempts at rate control.  Would proceed with PORSHA/DCCV for restoration of SR.  Continue oral anticoagulation- Eliquis 5 mg BID preferably if insurance will cover.  Would recommend ROSE evaluation given h/o snoring.

## 2020-12-31 NOTE — DISCHARGE NOTE PROVIDER - HOSPITAL COURSE
Patient is a 36 y/o male with PMHx of paroxysmal a-fib (on warfarin, last dose 2 weeks ago), HTN, and DM II who presented to [facility] ED 12/30/2020 c/o palpitations and CP. He states symptoms started 40 minutes prior to arrival to ED. He reports that symptoms occurred acutely, and were similar to prior a-fib episodes. He explains that palpitations are associated with upper left sided chest pressure radiating to left shoulder. Since palpitations started patient reports dizziness, lightheadedness, and generalized weakness. When symptoms did not resolve he called EMS, and EMS found patient in rapid atrial fibrillation, and was given PO aspirin 162mg x1 and diltiazem 21mg POx1. Per ED attending report that HR improved from 160-140s. In addition, patient complains of worsening SOB which occurs at rest, and is worsened with exertion. In ED VS /9, , RR 18, O2 100%, EKG atrial fibrillation @ 129, no ST changes, CXR unremarkable, Labs trops x1 negative, COVID negative, INR 0.89, TSH WNL. In ED patient given Eliquis 5mg POx1, Diltiazem 60mg POx1, IV Diltiazem 20mg x1, and IV Diltiazem 10mg x1, with HR only improving to 130s, and subsequently started on diltiazem gtt.      Problem/Plan - 1:  ·  Problem: Rapid atrial fibrillation.  Plan: -Currently 's, EKG revealed atrial fibrillation @ 129bpm, no ischemic changes  -CXR unremarkable, TSH WNL, Trops x1 negative  -In ED given Eliquis 5mg POx1, Diltiazem 60mg POx1, IV Diltiazem 20mg x1, and IV Diltiazem 10mg x1, with HR only improving to 130s, and subsequently started on diltiazem gtt  -Patient reports noncompliance to warfarin at home. Last taken 2 weeks ago, INR subtherapeutic. Started on Eliquis 5mg BID   -diltiazem gtt discontinued and started on Lopressor 25mg PO q6h  -Echo ordered  -NPO after MN for PORSHA/DCCV, consult EP in AM  -UTOX negative  -monitor on tele.     Problem/Plan - 2:  ·  Problem: Essential hypertension.  Plan: -SBP 140s  -continue home meds.      Problem/Plan - 3:  ·  Problem: Diabetes mellitus.  Plan: -f/u A1C and LDL  -continue MISS and FS's    36 y/o male, uninsured, with PMHx of paroxysmal a-fib (on warfarin, non compliant), HTN, and DM II who presented to [facility] ED 12/30/2020 c/o palpitations and CP and he called.  EMS found patient in rapid atrial fibrillation.  EKG at [facility] revealing atrial fibrillation @ 129, no ST changes, CXR unremarkable, Labs trops x1 negative, COVID negative.  Patient received Eliquis and IV and PO Diltiazam with minimal improvement and started on dilt gtt.  Patient transferrered to [facility] for continued management of Afib.  On arrival to [facility] patient  diltiazem gtt discontinued and patient transitioned to lopressor     Problem/Plan - 1:  ·  Problem: Rapid atrial fibrillation.  Plan: -Currently 's, EKG revealed atrial fibrillation @ 129bpm, no ischemic changes  -CXR unremarkable, TSH WNL, Trops x1 negative  -In ED given Eliquis 5mg POx1, Diltiazem 60mg POx1, IV Diltiazem 20mg x1, and IV Diltiazem 10mg x1, with HR only improving to 130s, and subsequently started on diltiazem gtt  -Patient reports noncompliance to warfarin at home. Last taken 2 weeks ago, INR subtherapeutic. Started on Eliquis 5mg BID   -diltiazem gtt discontinued and started on Lopressor 25mg PO q6h  -Echo ordered  -NPO after MN for PORSHA/DCCV, consult EP in AM  -UTOX negative  -monitor on tele.     Problem/Plan - 2:  ·  Problem: Essential hypertension.  Plan: -SBP 140s  -continue home meds.      Problem/Plan - 3:  ·  Problem: Diabetes mellitus.  Plan: -f/u A1C and LDL  -continue MISS and FS's    34 y/o male, uninsured, with PMHx of paroxysmal a-fib (on warfarin, non compliant), HTN, and DM II who presented to [facility] ED 12/30/2020 c/o palpitations and CP and he called.  EMS found patient in rapid atrial fibrillation.  EKG at [facility] revealing atrial fibrillation @ 129, no ST changes, CXR unremarkable, Labs trops x1 negative, COVID negative.  Patient received Eliquis and IV and PO Diltiazam with minimal improvement and started on dilt gtt.  Patient transferrered to [facility] for continued management of Afib.  On arrival to [facility] patient  diltiazem gtt discontinued and patient transitioned to lopressor PO w/ intermittent IV Lopressor.  CE negative.  EP consulted.  Patient is s/p PORSHA revealing no thrombus and subsequent successful Cardioversion on 12/31.  Echocardiogram 12/31 revealing Tachycardia precludes segmental wall motion evaluation. Grossly, the left ventricular systolic function is borderline reduced. LVEF is    50%.   2. Mild symmetric left ventricular hypertrophy.   3. Normal right ventricular size and systolic function.   4. No significant valvular disease.   5. No evidence of pulmonary hypertension, pulmonary artery systolic pressure is 27 mmHg.   6. Trivial pericardial effusion.   7. No prior echo is available for comparison.   8. Patient was tachycardic during the study.     Problem/Plan - 1:  ·  Problem: Rapid atrial fibrillation.  Plan: -Currently 's, EKG revealed atrial fibrillation @ 129bpm, no ischemic changes  -CXR unremarkable, TSH WNL, Trops x1 negative  -In ED given Eliquis 5mg POx1, Diltiazem 60mg POx1, IV Diltiazem 20mg x1, and IV Diltiazem 10mg x1, with HR only improving to 130s, and subsequently started on diltiazem gtt  -Patient reports noncompliance to warfarin at home. Last taken 2 weeks ago, INR subtherapeutic. Started on Eliquis 5mg BID   -diltiazem gtt discontinued and started on Lopressor 25mg PO q6h  -Echo ordered  -NPO after MN for PORSHA/DCCV, consult EP in AM  -UTOX negative  -monitor on tele.     Problem/Plan - 2:  ·  Problem: Essential hypertension.  Plan: -SBP 140s  -continue home meds.      Problem/Plan - 3:  ·  Problem: Diabetes mellitus.  Plan: -f/u A1C and LDL  -continue MISS and FS's    34 y/o male, uninsured, with PMHx of paroxysmal a-fib (on warfarin, non compliant), HTN, and DM II who presented to [facility] ED 12/30/2020 c/o palpitations and CP and he called.  EMS found patient in rapid atrial fibrillation.  EKG at [facility] revealing atrial fibrillation @ 129, no ST changes, CXR unremarkable, Labs trops x1 negative, COVID negative.  Patient received Eliquis and IV and PO Diltiazam with minimal improvement and started on dilt gtt.  Patient transferrered to [facility] for continued management of Afib.  On arrival to [facility] patient  diltiazem gtt discontinued and patient transitioned to lopressor PO w/ intermittent IV Lopressor.  CE negative.  EP consulted.  Patient is s/p PORSHA revealing no thrombus and subsequent successful Cardioversion on 12/31.  Echocardiogram 12/31 revealing Tachycardia precludes segmental wall motion evaluation. Grossly, the left ventricular systolic function is borderline reduced. LVEF is 50%, Mild symmetric left ventricular hypertrophy.  Patient remains HD stable and in normal sinus rhythm.  Labs, Vitals, meds reviewed with Dr. Hunter and patient deemed stable for discharge home post cardioversion w/ escort.  He is discharged home on cardiac medications Toprol XL 50mg daily.  Coumadin transitioned to Eliquis 5mg PO BID due to non compliance, and Atorvastatin 40mg QHS.  Patient is continued on home lisinopril 10mg daily and metformin.  Patient without insurance.  He is given a 1mo free supply [facility]  He is given 7 day free supply of the new medications on discharge Toprol XL 50mg daily and Atorvastatin 40mg QHS.  Refills of medications are sent to his outpatient pharmacy.  Patient is recommended to follow up with his Cardiology Clinic Vicki Driscoll and PMD Dr. Jennifer Gar in the [place].  Patient is also recommended to follow up with [facility] [place] to enroll in the prescription drug program.  All discharge instructions reviewed with patient with  ID number [id number].

## 2020-12-31 NOTE — PROVIDER CONTACT NOTE (OTHER) - ASSESSMENT
Pt oriented with no acute distress, verbalized feeling palpitations, lopressor 25mg tab given, SBP stable. Cardiac PA made aware.

## 2020-12-31 NOTE — DISCHARGE NOTE PROVIDER - CARE PROVIDER_API CALL
Mauricio Carr)  Cardiac Electrophysiology  [address], [address] [place] [address]  Phone: [phone]  Fax: [phone]  Scheduled Appointment: 01/27/2021 10:00 AM   Mauricio Carr)  Cardiac Electrophysiology  [address], [address] [place] [address]  Phone: [phone]  Fax: [phone]  Scheduled Appointment: 01/27/2021 10:00 AM    Mee Driscoll  Cardiologist  Phone: (   )    -  Fax: (   )    -  Follow Up Time: 2 weeks    Jennifer Gar  primary care doctor  Phone: (   )    -  Fax: (   )    -  Follow Up Time: 2 weeks

## 2020-12-31 NOTE — DISCHARGE NOTE PROVIDER - PROVIDER TOKENS
PROVIDER:[TOKEN:[[id number]],SCHEDULEDAPPT:[01/27/2021],SCHEDULEDAPPTTIME:[10:00 AM]] PROVIDER:[TOKEN:[[id number]],SCHEDULEDAPPT:[01/27/2021],SCHEDULEDAPPTTIME:[10:00 AM]],FREE:[LAST:[Yamila],FIRST:[Odiya],PHONE:[(   )    -],FAX:[(   )    -],ADDRESS:[Cardiologist],FOLLOWUP:[2 weeks]],FREE:[LAST:[Rin],FIRST:[Jennifer],PHONE:[(   )    -],FAX:[(   )    -],ADDRESS:[primary care doctor],FOLLOWUP:[2 weeks]]

## 2020-12-31 NOTE — CONSULT NOTE ADULT - ATTENDING COMMENTS
Atrial fibrillaiton with rapid response, prior history of atrial fibrillation. NGOC VASC of 0, structurally normal heart. Will proceed with PORSHA/DCC, discharge on anticoagulation and bblockers. FOllow up as an outpatient to discuss long term rhythm control strategy.

## 2020-12-31 NOTE — DISCHARGE NOTE PROVIDER - NSDCCPCAREPLAN_GEN_ALL_CORE_FT
PRINCIPAL DISCHARGE DIAGNOSIS  Diagnosis: Rapid atrial fibrillation  Assessment and Plan of Treatment:       SECONDARY DISCHARGE DIAGNOSES  Diagnosis: Essential hypertension  Assessment and Plan of Treatment: Essential hypertension    Diagnosis: Diabetes mellitus  Assessment and Plan of Treatment: Diabetes mellitus     PRINCIPAL DISCHARGE DIAGNOSIS  Diagnosis: Rapid atrial fibrillation  Assessment and Plan of Treatment: You presented with atrial fibrillation with an accellerate rate.  YOu underwent a successful Cardioversion on 12/31/20 and are back in a normal heart rythm  - to help control you heart rate please STOP taking Coreg and START taking Metoprolol XL 50mg daily. (you received 7 days free supply and refills sent to [facility])  - to prevent stroke and blood clot please STOP taking Coumadin due to non compliance and requirment of blood draws.  Please START taking Eliquis 5mg Twice daily.  (you received 1 mo free supply and refills sent to [facility]).   - Please follow up with Dr. Ng (electrophysiologist) on 1/27/21 at 10am.   - Please follow up with your outpatient Cardiac clinic Vicki Driscoll in the [place].      SECONDARY DISCHARGE DIAGNOSES  Diagnosis: High cholesterol  Assessment and Plan of Treatment: You are found to have elevated cholesterol.  YOur cholesterol is 219 and your LDL is 131  - Please START taking Atorvastatin 40mg daily at bedtime  - Follow up with your PMD Dr. Gar for further management.    Diagnosis: Essential hypertension  Assessment and Plan of Treatment: - Continue to take lisinopril 10mg daily  - Follow up with your PMD Dr. Gar      Diagnosis: Diabetes mellitus  Assessment and Plan of Treatment: Your diabetes is not well controlled.  Your Hgb A1c is 7.9 and your goal is less then 7.  please continue to take metformin 1000mg Twice daily.  Follow up with your primary care doctor.    Diagnosis: Does not have health insurance  Assessment and Plan of Treatment: becuase you do not have health insurance we recommend you follow up at any of the [facility] such has [facility] where you can see a PMD and enroll in a prescription drug plan to obtain cheaper prescriptions.

## 2020-12-31 NOTE — DISCHARGE NOTE PROVIDER - NSDCMRMEDTOKEN_GEN_ALL_CORE_FT
apixaban 5 mg oral tablet: 1 tab(s) orally every 12 hours  atorvastatin 40 mg oral tablet: 1 tab(s) orally once a day (at bedtime)  lisinopril 10 mg oral tablet: 1 tab(s) orally once a day  metFORMIN 1000 mg oral tablet: 1 tab(s) orally 2 times a day  Metoprolol Succinate ER 50 mg oral tablet, extended release: 1 tab(s) orally once a day

## 2020-12-31 NOTE — DISCHARGE NOTE PROVIDER - CARE PROVIDERS DIRECT ADDRESSES
,[email or web address],DirectAddress_Unknown,DirectAddress_Unknown

## 2020-12-31 NOTE — DISCHARGE NOTE NURSING/CASE MANAGEMENT/SOCIAL WORK - PATIENT PORTAL LINK FT
You can access the FollowMyHealth Patient Portal offered by [facility] by registering at the following website: [email or web address]. By joining [facility]’s FollowMyHealth portal, you will also be able to view your health information using other applications (apps) compatible with our system.

## 2020-12-31 NOTE — PROVIDER CONTACT NOTE (OTHER) - BACKGROUND
Patient is a 34 y/o male with PMHx of paroxysmal a-fib (on warfarin, last dose 2 weeks ago), HTN, and DM II who presented to [facility] ED 12/30/2020 c/o palpitations and CP.

## 2021-01-01 LAB
SARS-COV-2 IGG SERPL QL IA: POSITIVE
SARS-COV-2 IGM SERPL IA-ACNC: 2.88 INDEX — HIGH

## 2021-01-04 PROBLEM — Z00.00 ENCOUNTER FOR PREVENTIVE HEALTH EXAMINATION: Status: ACTIVE | Noted: 2021-01-04

## 2021-01-05 DIAGNOSIS — Z91.14 PATIENT'S OTHER NONCOMPLIANCE WITH MEDICATION REGIMEN: ICD-10-CM

## 2021-01-05 DIAGNOSIS — I48.91 UNSPECIFIED ATRIAL FIBRILLATION: ICD-10-CM

## 2021-01-05 DIAGNOSIS — Z79.01 LONG TERM (CURRENT) USE OF ANTICOAGULANTS: ICD-10-CM

## 2021-01-05 DIAGNOSIS — I10 ESSENTIAL (PRIMARY) HYPERTENSION: ICD-10-CM

## 2021-01-05 DIAGNOSIS — E11.9 TYPE 2 DIABETES MELLITUS WITHOUT COMPLICATIONS: ICD-10-CM

## 2021-01-05 DIAGNOSIS — E78.5 HYPERLIPIDEMIA, UNSPECIFIED: ICD-10-CM

## 2021-01-05 DIAGNOSIS — I48.19 OTHER PERSISTENT ATRIAL FIBRILLATION: ICD-10-CM

## 2021-01-27 ENCOUNTER — APPOINTMENT (OUTPATIENT)
Dept: HEART AND VASCULAR | Facility: CLINIC | Age: 36
End: 2021-01-27

## 2022-07-16 ENCOUNTER — INPATIENT (INPATIENT)
Facility: HOSPITAL | Age: 37
LOS: 4 days | Discharge: HOME | DRG: 310 | End: 2022-07-21
Attending: INTERNAL MEDICINE | Admitting: INTERNAL MEDICINE
Payer: MEDICAID

## 2022-07-16 VITALS
DIASTOLIC BLOOD PRESSURE: 72 MMHG | RESPIRATION RATE: 18 BRPM | HEIGHT: 68 IN | OXYGEN SATURATION: 98 % | WEIGHT: 139.99 LBS | HEART RATE: 150 BPM | SYSTOLIC BLOOD PRESSURE: 114 MMHG | TEMPERATURE: 99 F

## 2022-07-16 DIAGNOSIS — I48.91 UNSPECIFIED ATRIAL FIBRILLATION: ICD-10-CM

## 2022-07-16 LAB
ACETONE SERPL-MCNC: NEGATIVE — SIGNIFICANT CHANGE UP
ALBUMIN SERPL ELPH-MCNC: 3.7 G/DL — SIGNIFICANT CHANGE UP (ref 3.5–5)
ALP SERPL-CCNC: 135 U/L — HIGH (ref 40–120)
ALT FLD-CCNC: 66 U/L DA — HIGH (ref 10–60)
ANION GAP SERPL CALC-SCNC: 9 MMOL/L — SIGNIFICANT CHANGE UP (ref 5–17)
APTT BLD: 28.4 SEC — SIGNIFICANT CHANGE UP (ref 27.5–35.5)
AST SERPL-CCNC: 38 U/L — SIGNIFICANT CHANGE UP (ref 10–40)
BASOPHILS # BLD AUTO: 0.05 K/UL — SIGNIFICANT CHANGE UP (ref 0–0.2)
BASOPHILS NFR BLD AUTO: 0.6 % — SIGNIFICANT CHANGE UP (ref 0–2)
BILIRUB SERPL-MCNC: 0.2 MG/DL — SIGNIFICANT CHANGE UP (ref 0.2–1.2)
BUN SERPL-MCNC: 10 MG/DL — SIGNIFICANT CHANGE UP (ref 7–18)
CALCIUM SERPL-MCNC: 8.8 MG/DL — SIGNIFICANT CHANGE UP (ref 8.4–10.5)
CHLORIDE SERPL-SCNC: 107 MMOL/L — SIGNIFICANT CHANGE UP (ref 96–108)
CO2 SERPL-SCNC: 20 MMOL/L — LOW (ref 22–31)
CREAT SERPL-MCNC: 1.25 MG/DL — SIGNIFICANT CHANGE UP (ref 0.5–1.3)
EGFR: 77 ML/MIN/1.73M2 — SIGNIFICANT CHANGE UP
EOSINOPHIL # BLD AUTO: 0.03 K/UL — SIGNIFICANT CHANGE UP (ref 0–0.5)
EOSINOPHIL NFR BLD AUTO: 0.3 % — SIGNIFICANT CHANGE UP (ref 0–6)
GLUCOSE SERPL-MCNC: 288 MG/DL — HIGH (ref 70–99)
HCT VFR BLD CALC: 48.8 % — SIGNIFICANT CHANGE UP (ref 39–50)
HGB BLD-MCNC: 16.3 G/DL — SIGNIFICANT CHANGE UP (ref 13–17)
HIV 1 & 2 AB SERPL IA.RAPID: SIGNIFICANT CHANGE UP
IMM GRANULOCYTES NFR BLD AUTO: 0.3 % — SIGNIFICANT CHANGE UP (ref 0–1.5)
INR BLD: 0.9 RATIO — SIGNIFICANT CHANGE UP (ref 0.88–1.16)
LYMPHOCYTES # BLD AUTO: 2.39 K/UL — SIGNIFICANT CHANGE UP (ref 1–3.3)
LYMPHOCYTES # BLD AUTO: 26.5 % — SIGNIFICANT CHANGE UP (ref 13–44)
MAGNESIUM SERPL-MCNC: 2.4 MG/DL — SIGNIFICANT CHANGE UP (ref 1.6–2.6)
MCHC RBC-ENTMCNC: 29.2 PG — SIGNIFICANT CHANGE UP (ref 27–34)
MCHC RBC-ENTMCNC: 33.4 GM/DL — SIGNIFICANT CHANGE UP (ref 32–36)
MCV RBC AUTO: 87.5 FL — SIGNIFICANT CHANGE UP (ref 80–100)
MONOCYTES # BLD AUTO: 0.71 K/UL — SIGNIFICANT CHANGE UP (ref 0–0.9)
MONOCYTES NFR BLD AUTO: 7.9 % — SIGNIFICANT CHANGE UP (ref 2–14)
NEUTROPHILS # BLD AUTO: 5.82 K/UL — SIGNIFICANT CHANGE UP (ref 1.8–7.4)
NEUTROPHILS NFR BLD AUTO: 64.4 % — SIGNIFICANT CHANGE UP (ref 43–77)
NRBC # BLD: 0 /100 WBCS — SIGNIFICANT CHANGE UP (ref 0–0)
PLATELET # BLD AUTO: 193 K/UL — SIGNIFICANT CHANGE UP (ref 150–400)
POTASSIUM SERPL-MCNC: 4 MMOL/L — SIGNIFICANT CHANGE UP (ref 3.5–5.3)
POTASSIUM SERPL-SCNC: 4 MMOL/L — SIGNIFICANT CHANGE UP (ref 3.5–5.3)
PROT SERPL-MCNC: 8 G/DL — SIGNIFICANT CHANGE UP (ref 6–8.3)
PROTHROM AB SERPL-ACNC: 10.7 SEC — SIGNIFICANT CHANGE UP (ref 10.5–13.4)
RBC # BLD: 5.58 M/UL — SIGNIFICANT CHANGE UP (ref 4.2–5.8)
RBC # FLD: 13.4 % — SIGNIFICANT CHANGE UP (ref 10.3–14.5)
SARS-COV-2 RNA SPEC QL NAA+PROBE: SIGNIFICANT CHANGE UP
SODIUM SERPL-SCNC: 136 MMOL/L — SIGNIFICANT CHANGE UP (ref 135–145)
T4 AB SER-ACNC: 7.9 UG/DL — SIGNIFICANT CHANGE UP (ref 4.6–12)
TROPONIN I, HIGH SENSITIVITY RESULT: 33.2 NG/L — SIGNIFICANT CHANGE UP
TSH SERPL-MCNC: 0.46 UU/ML — SIGNIFICANT CHANGE UP (ref 0.34–4.82)
WBC # BLD: 9.03 K/UL — SIGNIFICANT CHANGE UP (ref 3.8–10.5)
WBC # FLD AUTO: 9.03 K/UL — SIGNIFICANT CHANGE UP (ref 3.8–10.5)

## 2022-07-16 PROCEDURE — 99291 CRITICAL CARE FIRST HOUR: CPT

## 2022-07-16 PROCEDURE — 93010 ELECTROCARDIOGRAM REPORT: CPT

## 2022-07-16 PROCEDURE — 99223 1ST HOSP IP/OBS HIGH 75: CPT | Mod: GC

## 2022-07-16 PROCEDURE — 71045 X-RAY EXAM CHEST 1 VIEW: CPT | Mod: 26

## 2022-07-16 RX ORDER — ASPIRIN/CALCIUM CARB/MAGNESIUM 324 MG
162 TABLET ORAL ONCE
Refills: 0 | Status: COMPLETED | OUTPATIENT
Start: 2022-07-16 | End: 2022-07-16

## 2022-07-16 RX ORDER — DILTIAZEM HCL 120 MG
20 CAPSULE, EXT RELEASE 24 HR ORAL ONCE
Refills: 0 | Status: COMPLETED | OUTPATIENT
Start: 2022-07-16 | End: 2022-07-16

## 2022-07-16 RX ORDER — ENOXAPARIN SODIUM 100 MG/ML
70 INJECTION SUBCUTANEOUS ONCE
Refills: 0 | Status: COMPLETED | OUTPATIENT
Start: 2022-07-16 | End: 2022-07-16

## 2022-07-16 RX ORDER — DILTIAZEM HCL 120 MG
10 CAPSULE, EXT RELEASE 24 HR ORAL
Qty: 125 | Refills: 0 | Status: DISCONTINUED | OUTPATIENT
Start: 2022-07-16 | End: 2022-07-18

## 2022-07-16 RX ORDER — DILTIAZEM HCL 120 MG
30 CAPSULE, EXT RELEASE 24 HR ORAL ONCE
Refills: 0 | Status: DISCONTINUED | OUTPATIENT
Start: 2022-07-16 | End: 2022-07-16

## 2022-07-16 RX ORDER — METOPROLOL TARTRATE 50 MG
5 TABLET ORAL ONCE
Refills: 0 | Status: COMPLETED | OUTPATIENT
Start: 2022-07-16 | End: 2022-07-16

## 2022-07-16 RX ADMIN — Medication 20 MILLIGRAM(S): at 18:03

## 2022-07-16 RX ADMIN — ENOXAPARIN SODIUM 70 MILLIGRAM(S): 100 INJECTION SUBCUTANEOUS at 18:15

## 2022-07-16 RX ADMIN — Medication 162 MILLIGRAM(S): at 18:15

## 2022-07-16 RX ADMIN — Medication 5 MILLIGRAM(S): at 18:26

## 2022-07-16 RX ADMIN — Medication 1 MILLIGRAM(S): at 18:00

## 2022-07-16 RX ADMIN — Medication 10 MG/HR: at 19:37

## 2022-07-16 RX ADMIN — Medication 20 MILLIGRAM(S): at 19:20

## 2022-07-16 NOTE — H&P ADULT - PROBLEM SELECTOR PLAN 1
h/o Afib w/ RVR requiring three prior hospitalizations w/ cardioversions, last hosptialization 2021; non-compliant with medications (previously on coumadin and metoprolol) and loss to f/u >1yr  EKG shows Afib w/ RVR   s/p Cardizem IVP > metoprolol 5 IVP > Cardizem 20 IVP not rate controlled, and then started on Cardizem ggt in ED  CHADSVASC: 3 points with 4.6% risk of stroke, TIA, ans systemic embolism   Admit to tele- Goal rate <110  c/w FD lovenox for now, may need to bridge to warfarin once stable as patient does not have insurance   c/w with cardizem ggt for now for rate control  consider adding digoxin if not rate controlled   will hold off on cardioversion as sonja has been off AC and is at high risk for embolizing   f/u TTE  Cardio Consulted - Dr. Gonzales h/o Afib w/ RVR requiring three prior hospitalizations w/ cardioversions, last hosptialization 2021; non-compliant with medications (previously on coumadin and metoprolol) and loss to f/u >1yr  EKG shows Afib w/ RVR   s/p Cardizem IVP > metoprolol 5 IVP > Cardizem 20 IVP not rate controlled, and then started on Cardizem ggt in ED  CHADSVASC: 3 points with 4.6% risk of stroke, TIA, ans systemic embolism   Admit to tele- Goal rate <110  c/w FD lovenox for now, may need to bridge to warfarin once stable as patient does not have insurance   c/w with cardizem ggt for now for rate control  s/p digoxin loading 500mcg IVPx1 in ED   will hold off on cardioversion as sonja has been off AC and is at high risk for embolizing   f/u TTE  Cardio Consulted - Dr. Gonzales h/o Afib w/ RVR requiring three prior hospitalizations w/ cardioversions, last hosptialization 2021; non-compliant with medications (previously on coumadin and metoprolol) and loss to f/u >1yr  EKG shows Afib w/ RVR   s/p Cardizem IVP > metoprolol 5 IVP > Cardizem 20 IVP not rate controlled, and then started on Cardizem ggt in ED  CHADSVASC: 3 points with 4.6% risk of stroke, TIA, ans systemic embolism   Admit to tele- Goal rate <110  c/w FD lovenox for now, may need to bridge to warfarin once stable as patient does not have insurance   c/w with cardizem ggt for now for rate control  s/p digoxin loading 500mcg IVPx1 in ED   will hold off on cardioversion as sonja has been off AC and is at high risk for embolizing   f/u TTE  Cardio Consulted - Dr. Jaramillo

## 2022-07-16 NOTE — H&P ADULT - ATTENDING COMMENTS
Discussed with PGY2 Dr. Mcginnis.    This is a 37 y/o male with PMHx of afib, HLD and DM2 presenting with palpitations and anxiety x 1 day. On arrival patient noted to be in rapid Afib with HR >140. EKG c/w afib with RVR. Patient at present also endorsing chest discomfort. Patient reports being prescribed Coumadin and metoprolol in the past, but has been taking them for at least several months. Reports having had 3 cardioversions in the past for similar episodes. Will admit to telemetry for rapid afib.    Vital Signs Last 24 Hrs  T(C): 36.8 (16 Jul 2022 21:44), Max: 37.3 (16 Jul 2022 17:11)  T(F): 98.2 (16 Jul 2022 21:44), Max: 99.1 (16 Jul 2022 17:11)  HR: 133 (16 Jul 2022 21:44) (110 - 169)  BP: 103/63 (16 Jul 2022 21:44) (103/63 - 144/78)  BP(mean): --  RR: 18 (16 Jul 2022 21:44) (16 - 22)  SpO2: 99% (16 Jul 2022 21:44) (98% - 99%)    Parameters below as of 16 Jul 2022 21:44  Patient On (Oxygen Delivery Method): nasal cannula  O2 Flow (L/min): 2    PEx  as above    A/P:  #Atrial fibrillation with RVR  #HLD  #Uncontrolled DM2    - admit to telemetry  - c/w cardizem gtt at this time  - defer cardioversion as patient has been noncompliant with anticoagulation  - start heparin gtt  - resume home meds  - cards eval Discussed with PGY2 Dr. Mcginnis.    This is a 35 y/o male with PMHx of afib, HLD and DM2 presenting with palpitations and anxiety x 1 day. On arrival patient noted to be in rapid Afib with HR >140. EKG c/w afib with RVR. Patient at present also endorsing chest discomfort. Patient reports being prescribed Coumadin and metoprolol in the past, but has been taking them for at least several months. Reports having had 3 cardioversions in the past for similar episodes. Will admit to telemetry for rapid afib.    Vital Signs Last 24 Hrs  T(C): 36.8 (16 Jul 2022 21:44), Max: 37.3 (16 Jul 2022 17:11)  T(F): 98.2 (16 Jul 2022 21:44), Max: 99.1 (16 Jul 2022 17:11)  HR: 133 (16 Jul 2022 21:44) (110 - 169)  BP: 103/63 (16 Jul 2022 21:44) (103/63 - 144/78)  BP(mean): --  RR: 18 (16 Jul 2022 21:44) (16 - 22)  SpO2: 99% (16 Jul 2022 21:44) (98% - 99%)    Parameters below as of 16 Jul 2022 21:44  Patient On (Oxygen Delivery Method): nasal cannula  O2 Flow (L/min): 2    PEx  as above    A/P:  #Atrial fibrillation with RVR  #HLD  #Uncontrolled DM2    - admit to telemetry  - c/w cardizem gtt at this time  - defer cardioversion as patient has been noncompliant with anticoagulation  - start full dose lovenox  - resume home meds  - cards eval

## 2022-07-16 NOTE — ED PROVIDER NOTE - CLINICAL SUMMARY MEDICAL DECISION MAKING FREE TEXT BOX
pt with h/o cardiac arrhythmia, not on any meds or anticoagulant, now with rapid A. fib, will get labs., give meds., admission

## 2022-07-16 NOTE — H&P ADULT - ASSESSMENT
Patient is 35 y/o male, South Korean-speaking with PMHx of DM (on metformin and glipizide), HTN (on lisinopril), and cardiac arrythmia (not currently on AC or rate control meds) and no significant PSHx presenting with acute palpitations with associated chest pain, SOB, and nausea x1d. Cardio Dr. Gonzales consulted by ED provider. Patient admitted to tele for Afib w/ RVR, started on Cardizem gtt and dig loaded.       PRIMARY TEAM TO CONFIRM MEDICATIONS, PHARMACY CLOSED AT THE TIME OF ENCOUNTER.   SW AND CM CONSULTED, PATIENT MURPHY NOT HAVE INSURANCE, WILL NEED [facility] MEDS UPON DC.

## 2022-07-16 NOTE — ED PROVIDER NOTE - OBJECTIVE STATEMENT
36 year old male with PMHx of diabetes (on glipizide), hypertension (on lisinopril),  and cardiac arrythmia (not currently on anticoagulants) and no significant PSHx presenting to the ED with complaints of an episode of palpitations today. Patient states that he was brushing his teeth today when he suddenly developed nausea and began to feel rapid palpitations. Patient endorses feeling slightly short of breath at the time, however denies syncope or any other acute complaints. Patient states that he has no recent history of being hospitalized for palpitations, however has been hospitalized three times in the past with the last time being in 07/2021. Patient notes that he was prescribed Coumadin for his arrythmia, however reports noncompliance on the medication. Of note, patient has been vaccinated against COVID-19. NKDA.

## 2022-07-16 NOTE — H&P ADULT - NSHPPHYSICALEXAM_GEN_ALL_CORE
Vital Signs Last 24 Hrs  T(C): 36.7 (16 Jul 2022 22:56), Max: 37.3 (16 Jul 2022 17:11)  T(F): 98 (16 Jul 2022 22:56), Max: 99.1 (16 Jul 2022 17:11)  HR: 123 (16 Jul 2022 23:40) (105 - 169)  BP: 112/84 (16 Jul 2022 23:40) (103/63 - 144/78)  BP(mean): --  RR: 18 (16 Jul 2022 23:40) (16 - 22)  SpO2: 100% (16 Jul 2022 23:40) (98% - 100%)    Parameters below as of 16 Jul 2022 23:40  Patient On (Oxygen Delivery Method): nasal cannula  O2 Flow (L/min): 2      GENERAL: NAD, lying in bed (+) uncomfortable   HEAD:  Atraumatic, Normocephalic  EYES: EOMI, PERRLA, conjunctiva and sclera clear  ENT: Moist mucous membranes  NECK: Supple, No JVD  CHEST/LUNG: Clear to auscultation bilaterally; No rales, rhonchi, wheezing, or rubs. (-) NO O2, Saturating 99% on RA   HEART: (+) Irregular rate and rhythm (+) Tachycardic HR>116-140  ABDOMEN: Bowel sounds present; Soft, Nontender, Nondistended. No hepatomegally  EXTREMITIES:  2+ Peripheral Pulses, brisk capillary refill. No clubbing, cyanosis, or edema  NERVOUS SYSTEM:  Alert & Oriented X3, speech clear. No deficits   MSK: FROM all 4 extremities, full and equal strength  SKIN: No rashes or lesions

## 2022-07-16 NOTE — H&P ADULT - NSHPSOCIALHISTORY_GEN_ALL_CORE
Patient coming from home, not compliant with cardiac medications d/t insurance and moved away from providers in the [place].

## 2022-07-16 NOTE — H&P ADULT - HISTORY OF PRESENT ILLNESS
Patient is 35 y/o male, Taiwanese-speaking with PMHx of DM (on metformin and glipizide), HTN (on lisinopril), and cardiac arrythmia (not currently on AC or rate control meds) and no significant PSHx presenting with acute palpitations with associated chest pain, SOB, and nausea x1d. Patient states that he was brushing his teeth today when he suddenly developed nausea and began to feel palpitations, and endorses SOB at the time, however denies syncope or any other acute complaints. He denies any recent hospitalizations for palpitations, however has been hospitalized three times in the past, with the last time being in 07/2021. He reports that the prior hospitalizations required cardioversions to control arrhythmia. He notes that he was been prescribed Coumadin and Metoprolol for his arrythmia, however reports noncompliance on the medications for >1yr, since moving from the [place] with loss of o/p providers. He endorses a recent URI that has resolved on its own, and only states residual dry cough. During encounter patient endorsed HA, chest pain, and SOB associated with rapid palpitations. Patient denies abdominal pain and changes in urination and BM.

## 2022-07-16 NOTE — H&P ADULT - PROBLEM SELECTOR PLAN 2
h/o DM on Metformin and Glipizide   will hold oral dm meds  f/u A1c  will start on ISS for now  Adjust insulin as indicated  FS ACHS

## 2022-07-16 NOTE — ED PROVIDER NOTE - PROGRESS NOTE DETAILS
pt with rapid A. fib, will admit pt. Case d/w Dr. Morales pt with rapid A. fib, will admit pt.  Case d/w ICU, since pt's not hypotensive, Cardizem drip can be admitted to Tele

## 2022-07-17 DIAGNOSIS — E11.9 TYPE 2 DIABETES MELLITUS WITHOUT COMPLICATIONS: ICD-10-CM

## 2022-07-17 DIAGNOSIS — I10 ESSENTIAL (PRIMARY) HYPERTENSION: ICD-10-CM

## 2022-07-17 DIAGNOSIS — Z29.9 ENCOUNTER FOR PROPHYLACTIC MEASURES, UNSPECIFIED: ICD-10-CM

## 2022-07-17 DIAGNOSIS — I48.91 UNSPECIFIED ATRIAL FIBRILLATION: ICD-10-CM

## 2022-07-17 LAB
A1C WITH ESTIMATED AVERAGE GLUCOSE RESULT: 8 % — HIGH (ref 4–5.6)
ALBUMIN SERPL ELPH-MCNC: 3.2 G/DL — LOW (ref 3.5–5)
ALP SERPL-CCNC: 100 U/L — SIGNIFICANT CHANGE UP (ref 40–120)
ALT FLD-CCNC: 48 U/L DA — SIGNIFICANT CHANGE UP (ref 10–60)
ANION GAP SERPL CALC-SCNC: 7 MMOL/L — SIGNIFICANT CHANGE UP (ref 5–17)
APPEARANCE UR: CLEAR — SIGNIFICANT CHANGE UP
AST SERPL-CCNC: 20 U/L — SIGNIFICANT CHANGE UP (ref 10–40)
BASOPHILS # BLD AUTO: 0.06 K/UL — SIGNIFICANT CHANGE UP (ref 0–0.2)
BASOPHILS NFR BLD AUTO: 0.6 % — SIGNIFICANT CHANGE UP (ref 0–2)
BILIRUB SERPL-MCNC: 0.5 MG/DL — SIGNIFICANT CHANGE UP (ref 0.2–1.2)
BILIRUB UR-MCNC: NEGATIVE — SIGNIFICANT CHANGE UP
BUN SERPL-MCNC: 7 MG/DL — SIGNIFICANT CHANGE UP (ref 7–18)
CALCIUM SERPL-MCNC: 8.4 MG/DL — SIGNIFICANT CHANGE UP (ref 8.4–10.5)
CHLORIDE SERPL-SCNC: 109 MMOL/L — HIGH (ref 96–108)
CO2 SERPL-SCNC: 23 MMOL/L — SIGNIFICANT CHANGE UP (ref 22–31)
COLOR SPEC: YELLOW — SIGNIFICANT CHANGE UP
CREAT SERPL-MCNC: 0.63 MG/DL — SIGNIFICANT CHANGE UP (ref 0.5–1.3)
DIFF PNL FLD: NEGATIVE — SIGNIFICANT CHANGE UP
EGFR: 126 ML/MIN/1.73M2 — SIGNIFICANT CHANGE UP
EOSINOPHIL # BLD AUTO: 0.06 K/UL — SIGNIFICANT CHANGE UP (ref 0–0.5)
EOSINOPHIL NFR BLD AUTO: 0.6 % — SIGNIFICANT CHANGE UP (ref 0–6)
ESTIMATED AVERAGE GLUCOSE: 183 MG/DL — HIGH (ref 68–114)
GLUCOSE BLDC GLUCOMTR-MCNC: 187 MG/DL — HIGH (ref 70–99)
GLUCOSE BLDC GLUCOMTR-MCNC: 205 MG/DL — HIGH (ref 70–99)
GLUCOSE BLDC GLUCOMTR-MCNC: 208 MG/DL — HIGH (ref 70–99)
GLUCOSE BLDC GLUCOMTR-MCNC: 225 MG/DL — HIGH (ref 70–99)
GLUCOSE SERPL-MCNC: 173 MG/DL — HIGH (ref 70–99)
GLUCOSE UR QL: 1000 MG/DL
HCT VFR BLD CALC: 48.2 % — SIGNIFICANT CHANGE UP (ref 39–50)
HGB BLD-MCNC: 16 G/DL — SIGNIFICANT CHANGE UP (ref 13–17)
IMM GRANULOCYTES NFR BLD AUTO: 0.3 % — SIGNIFICANT CHANGE UP (ref 0–1.5)
KETONES UR-MCNC: ABNORMAL
LEUKOCYTE ESTERASE UR-ACNC: NEGATIVE — SIGNIFICANT CHANGE UP
LYMPHOCYTES # BLD AUTO: 2.52 K/UL — SIGNIFICANT CHANGE UP (ref 1–3.3)
LYMPHOCYTES # BLD AUTO: 26.4 % — SIGNIFICANT CHANGE UP (ref 13–44)
MAGNESIUM SERPL-MCNC: 2.1 MG/DL — SIGNIFICANT CHANGE UP (ref 1.6–2.6)
MCHC RBC-ENTMCNC: 29.1 PG — SIGNIFICANT CHANGE UP (ref 27–34)
MCHC RBC-ENTMCNC: 33.2 GM/DL — SIGNIFICANT CHANGE UP (ref 32–36)
MCV RBC AUTO: 87.8 FL — SIGNIFICANT CHANGE UP (ref 80–100)
MONOCYTES # BLD AUTO: 0.62 K/UL — SIGNIFICANT CHANGE UP (ref 0–0.9)
MONOCYTES NFR BLD AUTO: 6.5 % — SIGNIFICANT CHANGE UP (ref 2–14)
NEUTROPHILS # BLD AUTO: 6.27 K/UL — SIGNIFICANT CHANGE UP (ref 1.8–7.4)
NEUTROPHILS NFR BLD AUTO: 65.6 % — SIGNIFICANT CHANGE UP (ref 43–77)
NITRITE UR-MCNC: NEGATIVE — SIGNIFICANT CHANGE UP
NRBC # BLD: 0 /100 WBCS — SIGNIFICANT CHANGE UP (ref 0–0)
PH UR: 5 — SIGNIFICANT CHANGE UP (ref 5–8)
PHOSPHATE SERPL-MCNC: 2 MG/DL — LOW (ref 2.5–4.5)
PLATELET # BLD AUTO: 181 K/UL — SIGNIFICANT CHANGE UP (ref 150–400)
POTASSIUM SERPL-MCNC: 3.8 MMOL/L — SIGNIFICANT CHANGE UP (ref 3.5–5.3)
POTASSIUM SERPL-SCNC: 3.8 MMOL/L — SIGNIFICANT CHANGE UP (ref 3.5–5.3)
PROT SERPL-MCNC: 6.9 G/DL — SIGNIFICANT CHANGE UP (ref 6–8.3)
PROT UR-MCNC: NEGATIVE — SIGNIFICANT CHANGE UP
RBC # BLD: 5.49 M/UL — SIGNIFICANT CHANGE UP (ref 4.2–5.8)
RBC # FLD: 13.3 % — SIGNIFICANT CHANGE UP (ref 10.3–14.5)
SODIUM SERPL-SCNC: 139 MMOL/L — SIGNIFICANT CHANGE UP (ref 135–145)
SP GR SPEC: 1.02 — SIGNIFICANT CHANGE UP (ref 1.01–1.02)
T3 SERPL-MCNC: 102 NG/DL — SIGNIFICANT CHANGE UP (ref 80–200)
UROBILINOGEN FLD QL: NEGATIVE — SIGNIFICANT CHANGE UP
WBC # BLD: 9.56 K/UL — SIGNIFICANT CHANGE UP (ref 3.8–10.5)
WBC # FLD AUTO: 9.56 K/UL — SIGNIFICANT CHANGE UP (ref 3.8–10.5)

## 2022-07-17 PROCEDURE — 99233 SBSQ HOSP IP/OBS HIGH 50: CPT | Mod: GC

## 2022-07-17 RX ORDER — INSULIN LISPRO 100/ML
VIAL (ML) SUBCUTANEOUS AT BEDTIME
Refills: 0 | Status: DISCONTINUED | OUTPATIENT
Start: 2022-07-17 | End: 2022-07-21

## 2022-07-17 RX ORDER — DIGOXIN 250 MCG
125 TABLET ORAL DAILY
Refills: 0 | Status: DISCONTINUED | OUTPATIENT
Start: 2022-07-18 | End: 2022-07-18

## 2022-07-17 RX ORDER — POTASSIUM PHOSPHATE, MONOBASIC POTASSIUM PHOSPHATE, DIBASIC 236; 224 MG/ML; MG/ML
30 INJECTION, SOLUTION INTRAVENOUS ONCE
Refills: 0 | Status: COMPLETED | OUTPATIENT
Start: 2022-07-17 | End: 2022-07-17

## 2022-07-17 RX ORDER — ENOXAPARIN SODIUM 100 MG/ML
65 INJECTION SUBCUTANEOUS EVERY 12 HOURS
Refills: 0 | Status: DISCONTINUED | OUTPATIENT
Start: 2022-07-17 | End: 2022-07-21

## 2022-07-17 RX ORDER — LISINOPRIL 2.5 MG/1
5 TABLET ORAL DAILY
Refills: 0 | Status: DISCONTINUED | OUTPATIENT
Start: 2022-07-17 | End: 2022-07-21

## 2022-07-17 RX ORDER — DIGOXIN 250 MCG
500 TABLET ORAL ONCE
Refills: 0 | Status: COMPLETED | OUTPATIENT
Start: 2022-07-17 | End: 2022-07-17

## 2022-07-17 RX ORDER — INSULIN LISPRO 100/ML
VIAL (ML) SUBCUTANEOUS
Refills: 0 | Status: DISCONTINUED | OUTPATIENT
Start: 2022-07-17 | End: 2022-07-21

## 2022-07-17 RX ORDER — DIGOXIN 250 MCG
250 TABLET ORAL EVERY 6 HOURS
Refills: 0 | Status: COMPLETED | OUTPATIENT
Start: 2022-07-17 | End: 2022-07-17

## 2022-07-17 RX ORDER — DIGOXIN 250 MCG
250 TABLET ORAL EVERY 6 HOURS
Refills: 0 | Status: DISCONTINUED | OUTPATIENT
Start: 2022-07-17 | End: 2022-07-17

## 2022-07-17 RX ORDER — ACETAMINOPHEN 500 MG
650 TABLET ORAL EVERY 6 HOURS
Refills: 0 | Status: DISCONTINUED | OUTPATIENT
Start: 2022-07-17 | End: 2022-07-21

## 2022-07-17 RX ORDER — WARFARIN SODIUM 2.5 MG/1
5 TABLET ORAL ONCE
Refills: 0 | Status: COMPLETED | OUTPATIENT
Start: 2022-07-17 | End: 2022-07-17

## 2022-07-17 RX ADMIN — Medication 10 MG/HR: at 08:09

## 2022-07-17 RX ADMIN — Medication 250 MICROGRAM(S): at 21:53

## 2022-07-17 RX ADMIN — ENOXAPARIN SODIUM 65 MILLIGRAM(S): 100 INJECTION SUBCUTANEOUS at 18:03

## 2022-07-17 RX ADMIN — ENOXAPARIN SODIUM 65 MILLIGRAM(S): 100 INJECTION SUBCUTANEOUS at 01:04

## 2022-07-17 RX ADMIN — Medication 500 MICROGRAM(S): at 02:45

## 2022-07-17 RX ADMIN — Medication 250 MICROGRAM(S): at 14:47

## 2022-07-17 RX ADMIN — WARFARIN SODIUM 5 MILLIGRAM(S): 2.5 TABLET ORAL at 21:53

## 2022-07-17 RX ADMIN — Medication 2: at 16:57

## 2022-07-17 RX ADMIN — Medication 10 MG/HR: at 16:10

## 2022-07-17 RX ADMIN — Medication 1: at 08:13

## 2022-07-17 RX ADMIN — POTASSIUM PHOSPHATE, MONOBASIC POTASSIUM PHOSPHATE, DIBASIC 83.33 MILLIMOLE(S): 236; 224 INJECTION, SOLUTION INTRAVENOUS at 10:48

## 2022-07-17 RX ADMIN — Medication 250 MICROGRAM(S): at 08:10

## 2022-07-17 RX ADMIN — Medication 2: at 12:02

## 2022-07-17 NOTE — PROGRESS NOTE ADULT - SUBJECTIVE AND OBJECTIVE BOX
PGY-1 Progress Note discussed with attending    PAGER #: [[phone]] TILL 5:00 PM  PLEASE CONTACT ON CALL TEAM:  - On Call Team (Please refer to Nanda) FROM 5:00 PM - 8:30PM  - Nightfloat Team FROM 8:30 -7:30 AM    CHIEF COMPLAINT & BRIEF HOSPITAL COURSE:    INTERVAL HPI/OVERNIGHT EVENTS:       REVIEW OF SYSTEMS:  CONSTITUTIONAL: No fever, weight loss, or fatigue  RESPIRATORY: No cough, wheezing, chills or hemoptysis; No shortness of breath  CARDIOVASCULAR: No chest pain, palpitations, dizziness, or leg swelling  GASTROINTESTINAL: No abdominal pain. No nausea, vomiting, or hematemesis; No diarrhea or constipation. No melena or hematochezia.  GENITOURINARY: No dysuria or hematuria, urinary frequency  NEUROLOGICAL: No headaches, memory loss, loss of strength, numbness, or tremors  SKIN: No itching, burning, rashes, or lesions     MEDICATIONS  (STANDING):  digoxin  Injectable 250 MICROGram(s) IV Push every 6 hours  diltiazem Infusion 10 mG/Hr (10 mL/Hr) IV Continuous <Continuous>  enoxaparin Injectable 65 milliGRAM(s) SubCutaneous every 12 hours  insulin lispro (ADMELOG) corrective regimen sliding scale   SubCutaneous three times a day before meals  insulin lispro (ADMELOG) corrective regimen sliding scale   SubCutaneous at bedtime  lisinopril 5 milliGRAM(s) Oral daily    MEDICATIONS  (PRN):  acetaminophen     Tablet .. 650 milliGRAM(s) Oral every 6 hours PRN Temp greater or equal to 38C (100.4F), Mild Pain (1 - 3)      Vital Signs Last 24 Hrs  T(C): 37.2 (17 Jul 2022 07:51), Max: 37.3 (16 Jul 2022 17:11)  T(F): 99 (17 Jul 2022 07:51), Max: 99.1 (16 Jul 2022 17:11)  HR: 113 (17 Jul 2022 07:51) (87 - 169)  BP: 121/69 (17 Jul 2022 07:51) (103/63 - 144/78)  BP(mean): --  RR: 19 (17 Jul 2022 07:51) (16 - 22)  SpO2: 98% (17 Jul 2022 07:51) (98% - 100%)    Parameters below as of 17 Jul 2022 07:51  Patient On (Oxygen Delivery Method): nasal cannula  O2 Flow (L/min): 2      PHYSICAL EXAMINATION:  GENERAL: NAD, well built  HEAD:  Atraumatic, Normocephalic  EYES:  conjunctiva and sclera clear  NECK: Supple, No JVD, Normal thyroid  CHEST/LUNG: Clear to auscultation. Clear to percussion bilaterally; No rales, rhonchi, wheezing, or rubs  HEART: Regular rate and rhythm; No murmurs, rubs, or gallops  ABDOMEN: Soft, Nontender, Nondistended; Bowel sounds present  NERVOUS SYSTEM:  Alert & Oriented X3,    EXTREMITIES:  2+ Peripheral Pulses, No clubbing, cyanosis, or edema  SKIN: warm dry                          16.0   9.56  )-----------( 181      ( 17 Jul 2022 07:48 )             48.2     07-16    136  |  107  |  10  ----------------------------<  288<H>  4.0   |  20<L>  |  1.25    Ca    8.8      16 Jul 2022 18:03  Mg     2.4     07-16    TPro  8.0  /  Alb  3.7  /  TBili  0.2  /  DBili  x   /  AST  38  /  ALT  66<H>  /  AlkPhos  135<H>  07-16    LIVER FUNCTIONS - ( 16 Jul 2022 18:03 )  Alb: 3.7 g/dL / Pro: 8.0 g/dL / ALK PHOS: 135 U/L / ALT: 66 U/L DA / AST: 38 U/L / GGT: x               PT/INR - ( 16 Jul 2022 18:03 )   PT: 10.7 sec;   INR: 0.90 ratio         PTT - ( 16 Jul 2022 18:03 )  PTT:28.4 sec        CAPILLARY BLOOD GLUCOSE  CAPILLARY BLOOD GLUCOSE        CAPILLARY BLOOD GLUCOSE          RADIOLOGY & ADDITIONAL TESTS:                   PGY-1 Progress Note discussed with attending    PAGER #: [[phone]] TILL 5:00 PM  PLEASE CONTACT ON CALL TEAM:  - On Call Team (Please refer to Nanda) FROM 5:00 PM - 8:30PM  - Nightfloat Team FROM 8:30 -7:30 AM      INTERVAL HPI/OVERNIGHT EVENTS:   patient examined bedside , he is still c/o palpitations but no chest pain or SOB , on digi loading, cardiazem drip but still in afib  with episodes of RVR on telemetry , remains hemodynamically stable with no change in mental status.     REVIEW OF SYSTEMS:  CONSTITUTIONAL: No fever, weight loss, or fatigue  RESPIRATORY: No cough, wheezing, chills or hemoptysis; No shortness of breath  CARDIOVASCULAR: No chest pain,+palpitations,  GASTROINTESTINAL: No abdominal pain. No nausea, vomiting, or hematemesis; No diarrhea or constipation. No melena or hematochezia.  GENITOURINARY: No dysuria or hematuria, urinary frequency  NEUROLOGICAL: No headaches, memory loss, loss of strength, numbness, or tremors  SKIN: No itching, burning, rashes, or lesions     MEDICATIONS  (STANDING):  digoxin  Injectable 250 MICROGram(s) IV Push every 6 hours  diltiazem Infusion 10 mG/Hr (10 mL/Hr) IV Continuous <Continuous>  enoxaparin Injectable 65 milliGRAM(s) SubCutaneous every 12 hours  insulin lispro (ADMELOG) corrective regimen sliding scale   SubCutaneous three times a day before meals  insulin lispro (ADMELOG) corrective regimen sliding scale   SubCutaneous at bedtime  lisinopril 5 milliGRAM(s) Oral daily    MEDICATIONS  (PRN):  acetaminophen     Tablet .. 650 milliGRAM(s) Oral every 6 hours PRN Temp greater or equal to 38C (100.4F), Mild Pain (1 - 3)      Vital Signs Last 24 Hrs  T(C): 37.2 (17 Jul 2022 07:51), Max: 37.3 (16 Jul 2022 17:11)  T(F): 99 (17 Jul 2022 07:51), Max: 99.1 (16 Jul 2022 17:11)  HR: 113 (17 Jul 2022 07:51) (87 - 169)  BP: 121/69 (17 Jul 2022 07:51) (103/63 - 144/78)  BP(mean): --  RR: 19 (17 Jul 2022 07:51) (16 - 22)  SpO2: 98% (17 Jul 2022 07:51) (98% - 100%)    Parameters below as of 17 Jul 2022 07:51  Patient On (Oxygen Delivery Method): nasal cannula  O2 Flow (L/min): 2      PHYSICAL EXAMINATION:  GENERAL: NAD, well built  HEAD:  Atraumatic, Normocephalic  EYES:  conjunctiva and sclera clear  NECK: Supple, No JVD, Normal thyroid  CHEST/LUNG: Clear to auscultation. Clear to percussion bilaterally; No rales, rhonchi, wheezing, or rubs  HEART: Regular rate and rhythm; No murmurs, rubs, or gallops  ABDOMEN: Soft, Nontender, Nondistended; Bowel sounds present  NERVOUS SYSTEM:  Alert & Oriented X3,    EXTREMITIES:  2+ Peripheral Pulses, No clubbing, cyanosis, or edema  SKIN: warm dry                          16.0   9.56  )-----------( 181      ( 17 Jul 2022 07:48 )             48.2     07-16    136  |  107  |  10  ----------------------------<  288<H>  4.0   |  20<L>  |  1.25    Ca    8.8      16 Jul 2022 18:03  Mg     2.4     07-16    TPro  8.0  /  Alb  3.7  /  TBili  0.2  /  DBili  x   /  AST  38  /  ALT  66<H>  /  AlkPhos  135<H>  07-16    LIVER FUNCTIONS - ( 16 Jul 2022 18:03 )  Alb: 3.7 g/dL / Pro: 8.0 g/dL / ALK PHOS: 135 U/L / ALT: 66 U/L DA / AST: 38 U/L / GGT: x               PT/INR - ( 16 Jul 2022 18:03 )   PT: 10.7 sec;   INR: 0.90 ratio         PTT - ( 16 Jul 2022 18:03 )  PTT:28.4 sec        CAPILLARY BLOOD GLUCOSE  CAPILLARY BLOOD GLUCOSE        CAPILLARY BLOOD GLUCOSE          RADIOLOGY & ADDITIONAL TESTS:                   PGY-1 Progress Note discussed with attending    PAGER #: [[phone]] TILL 5:00 PM  PLEASE CONTACT ON CALL TEAM:  - On Call Team (Please refer to Nanda) FROM 5:00 PM - 8:30PM  - Nightfloat Team FROM 8:30 -7:30 AM      INTERVAL HPI/OVERNIGHT EVENTS:   patient examined bedside , he is still c/o palpitations but no chest pain or SOB , on digi loading, cardiazem drip but still in afib  with episodes of RVR on telemetry , remains hemodynamically stable with no change in mental status.     REVIEW OF SYSTEMS:  CONSTITUTIONAL: No fever, weight loss, or fatigue  RESPIRATORY: No cough, wheezing, chills or hemoptysis; No shortness of breath  CARDIOVASCULAR: No chest pain,+palpitations,  GASTROINTESTINAL: No abdominal pain. No nausea, vomiting, or hematemesis; No diarrhea or constipation. No melena or hematochezia.  GENITOURINARY: No dysuria or hematuria, urinary frequency  NEUROLOGICAL: No headaches, memory loss, loss of strength, numbness, or tremors  SKIN: No itching, burning, rashes, or lesions     MEDICATIONS  (STANDING):  digoxin  Injectable 250 MICROGram(s) IV Push every 6 hours  diltiazem Infusion 10 mG/Hr (10 mL/Hr) IV Continuous <Continuous>  enoxaparin Injectable 65 milliGRAM(s) SubCutaneous every 12 hours  insulin lispro (ADMELOG) corrective regimen sliding scale   SubCutaneous three times a day before meals  insulin lispro (ADMELOG) corrective regimen sliding scale   SubCutaneous at bedtime  lisinopril 5 milliGRAM(s) Oral daily    MEDICATIONS  (PRN):  acetaminophen     Tablet .. 650 milliGRAM(s) Oral every 6 hours PRN Temp greater or equal to 38C (100.4F), Mild Pain (1 - 3)      Vital Signs Last 24 Hrs  T(C): 37.2 (17 Jul 2022 07:51), Max: 37.3 (16 Jul 2022 17:11)  T(F): 99 (17 Jul 2022 07:51), Max: 99.1 (16 Jul 2022 17:11)  HR: 113 (17 Jul 2022 07:51) (87 - 169)  BP: 121/69 (17 Jul 2022 07:51) (103/63 - 144/78)  BP(mean): --  RR: 19 (17 Jul 2022 07:51) (16 - 22)  SpO2: 98% (17 Jul 2022 07:51) (98% - 100%)    Parameters below as of 17 Jul 2022 07:51  Patient On (Oxygen Delivery Method): nasal cannula  O2 Flow (L/min): 2      PHYSICAL EXAMINATION:  GENERAL: NAD, well built  HEAD:  Atraumatic, Normocephalic  EYES:  conjunctiva and sclera clear  NECK: Supple, No JVD, Normal thyroid  CHEST/LUNG: Clear to auscultation. Clear to percussion bilaterally; No rales, rhonchi, wheezing, or rubs  HEART: Tachycardic, Irregular rhythm; No murmurs, rubs, or gallops  ABDOMEN: Soft, Nontender, Nondistended; Bowel sounds present  NERVOUS SYSTEM:  Alert & Oriented X3,    EXTREMITIES:  2+ Peripheral Pulses, No clubbing, cyanosis, or edema  SKIN: warm dry                          16.0   9.56  )-----------( 181      ( 17 Jul 2022 07:48 )             48.2     07-16    136  |  107  |  10  ----------------------------<  288<H>  4.0   |  20<L>  |  1.25    Ca    8.8      16 Jul 2022 18:03  Mg     2.4     07-16    TPro  8.0  /  Alb  3.7  /  TBili  0.2  /  DBili  x   /  AST  38  /  ALT  66<H>  /  AlkPhos  135<H>  07-16    LIVER FUNCTIONS - ( 16 Jul 2022 18:03 )  Alb: 3.7 g/dL / Pro: 8.0 g/dL / ALK PHOS: 135 U/L / ALT: 66 U/L DA / AST: 38 U/L / GGT: x               PT/INR - ( 16 Jul 2022 18:03 )   PT: 10.7 sec;   INR: 0.90 ratio         PTT - ( 16 Jul 2022 18:03 )  PTT:28.4 sec        CAPILLARY BLOOD GLUCOSE  CAPILLARY BLOOD GLUCOSE        CAPILLARY BLOOD GLUCOSE          RADIOLOGY & ADDITIONAL TESTS:

## 2022-07-17 NOTE — CONSULT NOTE ADULT - SUBJECTIVE AND OBJECTIVE BOX
Cardiology    HISTORY OF PRESENT ILLNESS: HPI:  Patient is 37 y/o male, Sudanese-speaking with PMHx of DM (on metformin and glipizide), HTN (on lisinopril), and cardiac arrythmia (not currently on AC or rate control meds) and no significant PSHx presenting with acute palpitations with associated chest pain, SOB, and nausea x1d. Patient states that he was brushing his teeth today when he suddenly developed nausea and began to feel palpitations, and endorses SOB at the time, however denies syncope or any other acute complaints. He denies any recent hospitalizations for palpitations, however has been hospitalized three times in the past, with the last time being in 07/2021. He reports that the prior hospitalizations required cardioversions to control arrhythmia. He notes that he was been prescribed Coumadin and Metoprolol for his arrythmia, however reports noncompliance on the medications for >1yr, since moving from the [place] with loss of o/p providers. He endorses a recent URI that has resolved on its own, and only states residual dry cough. During encounter patient endorsed HA, chest pain, and SOB associated with rapid palpitations. Patient denies abdominal pain and changes in urination and BM.  (16 Jul 2022 22:13)    Mr Cuevas is a pleasant 36yoM who presents with nausea and palpitations.  He does not have regular followup for a known history of paroxysmal AFib (he describes it as coming-and-going).  He current as well as prior episodes have all followed heavy drinking on the weekends.  As far as he knows, no spontaneous onset during the week.  He has a [id number] of 2, for HTN and Diabetes.  He has been prescribed Coumadin and Metoprolol but does not have regular acccess to outpatient care so he does not take these medications.  He has not previously been counselled re: EtOH cessation.  No history of fainting or sudden cardiac arrest.  A 10 pt ROS is otherwise negative.    PAST MEDICAL & SURGICAL HISTORY:  DM (diabetes mellitus)  Cardiac arrhythmia  HTN (hypertension)  No significant past surgical history    MEDICATIONS  (STANDING):  digoxin  Injectable 250 MICROGram(s) IV Push every 6 hours  diltiazem Infusion 10 mG/Hr (10 mL/Hr) IV Continuous <Continuous>  enoxaparin Injectable 65 milliGRAM(s) SubCutaneous every 12 hours  insulin lispro (ADMELOG) corrective regimen sliding scale   SubCutaneous three times a day before meals  insulin lispro (ADMELOG) corrective regimen sliding scale   SubCutaneous at bedtime  lisinopril 5 milliGRAM(s) Oral daily  warfarin 5 milliGRAM(s) Oral once    Allergies    No Known Allergies    Intolerances    FAMILY HISTORY:    Non-contributary for premature coronary disease or sudden cardiac death    SOCIAL HISTORY:    [x ] Non-smoker  [ ] Smoker  [x ] Alcohol:  14-20 beers, 1x/wk.    PHYSICAL EXAM:  T(C): 36.7 (07-17-22 @ 10:46), Max: 37.3 (07-16-22 @ 17:11)  HR: 84 (07-17-22 @ 10:46) (84 - 169)  BP: 122/74 (07-17-22 @ 10:46) (103/63 - 144/78)  RR: 19 (07-17-22 @ 10:46) (16 - 22)  SpO2: 98% (07-17-22 @ 10:46) (98% - 100%)  Wt(kg): --    Appearance: Normal-build  male in no acute distress, cooperative	  HEENT:   Normal oral mucosa, PERRL, EOMI	  Lymphatic: No lymphadenopathy , no edema  Cardiovascular: rapid irregular S1 S2, No JVD, No murmurs , Peripheral pulses palpable 2+ bilaterally  Respiratory: Lungs clear to auscultation, normal effort 	  Gastrointestinal:  Soft, Non-tender, + BS	  Skin: No rashes, No ecchymoses, No cyanosis, warm to touch  Musculoskeletal: Normal range of motion, normal strength  Psychiatry:  Mood & affect appropriate      TELEMETRY: AFib 110-160bpm	    ECG: AF/RVR 160bpm.  QRS is not pre-excited.  	  LABS:	 	                          16.0   9.56  )-----------( 181      ( 17 Jul 2022 07:48 )             48.2     07-17    139  |  109<H>  |  7   ----------------------------<  173<H>  3.8   |  23  |  0.63    Ca    8.4      17 Jul 2022 07:48  Phos  2.0     07-17  Mg     2.1     07-17    TPro  6.9  /  Alb  3.2<L>  /  TBili  0.5  /  DBili  x   /  AST  20  /  ALT  48  /  AlkPhos  100  07-17  TSH: Thyroid Stimulating Hormone, Serum: 0.46 uU/mL (07-16 @ 18:03)      ASSESSMENT/PLAN: 	36y Male with rapid paroxysmal atrial fibrillation.      Onset is always after heavy drinking, aka 'Holiday Heart' syndrome.    This will likely revert back to sinus on its own within a couple of days.  OK with current dose of diltiazem infusion and digoxin.  He could be transitioned to diltiazem 240mg ext-release, and the infusion turned off ~4 hrs after the pill is given.  Agree w/ 1mg/kg q12 lovenox dosing.    AFib is NOT a common diagnosis among young people.  It typically occurs in patients 66yo and older.  It is possible but less likely that he has another arrhythmia that is actually common among young people, such as an accessory pathway/ WPW tachycardia, or AV brissa reentry tachycardia, that deteriorated into AFib.  If he were to experience recurrences outside of drinking alcohol, an EP study for SVT ablation would be indicated.    If he remains in rapid AFib for a few days despite medical therapy, he could be cardioverted either with Flecainide or electrical shock.  Once HR reliably <120bpm, can check an echocardiogram.    Israel Gonzales M.D.  Cardiac Electrophysiology    office [phone]  pager [phone]

## 2022-07-17 NOTE — PATIENT PROFILE ADULT - HOW PATIENT ADDRESSED, PROFILE
This office note has been dictated.  Medical assistant history and information reviewed.  Past Medical History:   Diagnosis Date   • Abnormal mammogram of both breasts 2015   • Benign essential hypertension 2017   • Carotid artery occlusion    • Cataract of left eye 2017   • Chondromalacia, right knee 2018   • Colon polyp    • Complex tear of medial meniscus of right knee as current injury 2018   • DM (diabetes mellitus) (CMS/[facility])    • Encounter for medication monitoring 2017   • Glaucoma 2016   • Hyperlipidemia 2016   • Hyperlipidemia LDL goal <70 2017   • Meniscus, medial, derangement, right 2018   • Obesity    • Onychomycosis 2016   • Preop general physical exam 2017   • Primary open angle glaucoma (POAG) of left eye, severe stage 2018   • Primary open angle glaucoma (POAG) of right eye, mild stage 2018   • Primary open angle glaucoma of left eye, severe stage 2017   • Refraction error 2016   • Type 2 diabetes mellitus with hypoglycemia without coma, without long-term current use of insulin (CMS/[facility]) 2017     Family History   Problem Relation Age of Onset   • Cancer, Breast Sister    • Diabetes Sister    • Heart Mother    • Heart Father    • Diabetes Brother      Family Status   Relation Name Status   • Sis  Alive   • Mo     • Fa     • Bro     • MGMo     • MGFa     • PGMo     • PGFa       Past Surgical History:   Procedure Laterality Date   • Arthroscopy knee medial or lat Right 2018    Anna;medial   •  delivery only      x2   • Cryocautery of cervix      remote   • Dexa bone density axial skeleton  2016   • Open access colonoscopy      done in [facility]  reported polyps f/u was 3-5 yrs     Social History     Socioeconomic History   • Marital status: /Civil Union     Spouse name: Aman   • Number of children: 2   • Years of education: Not on  file   • Highest education level: Not on file   Occupational History   • Occupation: retired   Social Needs   • Financial resource strain: Not on file   • Food insecurity:     Worry: Not on file     Inability: Not on file   • Transportation needs:     Medical: Not on file     Non-medical: Not on file   Tobacco Use   • Smoking status: Never Smoker   • Smokeless tobacco: Never Used   Substance and Sexual Activity   • Alcohol use: Yes     Alcohol/week: 4.2 oz     Types: 7 Glasses of wine per week     Comment: daily wine with dinner   • Drug use: No   • Sexual activity: Yes     Partners: Male   Lifestyle   • Physical activity:     Days per week: Not on file     Minutes per session: Not on file   • Stress: Not on file   Relationships   • Social connections:     Talks on phone: Not on file     Gets together: Not on file     Attends Taoist service: Not on file     Active member of club or organization: Not on file     Attends meetings of clubs or organizations: Not on file     Relationship status: Not on file   • Intimate partner violence:     Fear of current or ex partner: Not on file     Emotionally abused: Not on file     Physically abused: Not on file     Forced sexual activity: Not on file   Other Topics Concern   • Not on file   Social History Narrative    Retired  from [place]      retired professor     OB History    Para Term  AB Living   3 2 2 0 0 2   SAB TAB Ectopic Molar Multiple Live Births   0 0 0 0 0 0        [place]

## 2022-07-17 NOTE — PROGRESS NOTE ADULT - PROBLEM SELECTOR PLAN 2
h/o DM on Metformin and Glipizide   will hold oral dm meds  f/u A1c  will start on ISS for now  Adjust insulin as indicated  FS ACHS h/o DM on Metformin and Glipizide   will hold oral dm meds  will start on ISS for now  Adjust insulin as indicated  FS ACHS

## 2022-07-17 NOTE — PROGRESS NOTE ADULT - PROBLEM SELECTOR PLAN 3
h/o HTN on Lisinopril   c/w home meds, will hhold for parameters   monitor BP h/o HTN on Lisinopril   c/w home meds, will hold for parameters   monitor BP

## 2022-07-17 NOTE — PROGRESS NOTE ADULT - PROBLEM SELECTOR PLAN 1
h/o Afib w/ RVR requiring three prior hospitalizations w/ cardioversions, last hosptialization 2021; non-compliant with medications (previously on coumadin and metoprolol) and loss to f/u >1yr  EKG shows Afib w/ RVR   s/p Cardizem IVP > metoprolol 5 IVP > Cardizem 20 IVP not rate controlled, and then started on Cardizem ggt in ED  CHADSVASC: 3 points with 4.6% risk of stroke, TIA, ans systemic embolism   Admit to tele- Goal rate <110  c/w FD lovenox for now, may need to bridge to warfarin once stable as patient does not have insurance   c/w with cardizem ggt for now for rate control  s/p digoxin loading 500mcg IVPx1 in ED   will hold off on cardioversion as sonja has been off AC and is at high risk for embolizing   f/u TTE  Cardio Consulted - Dr. Jaramillo h/o Afib w/ RVR requiring three prior hospitalizations w/ cardioversions, last hosptialization 2021; non-compliant with medications (previously on coumadin and metoprolol) and loss to f/u >1yr  EKG shows Afib w/ RVR   s/p Cardizem IVP > metoprolol 5 IVP > Cardizem 20 IVP not rate controlled, and then started on Cardizem ggt in ED  CHADSVASC: 3 points with 4.6% risk of stroke, TIA, ans systemic embolism   Admit to tele- Goal rate <110  c/w FD lovenox for now, may need to bridge to warfarin once stable as patient does not have insurance   c/w with cardizem ggt for now for rate control  s/p digoxin loading 500mcg IVPx1 in ED   will hold off on cardioversion as patient has been off AC and is at high risk for embolizing   f/u TTE  Cardio Consulted - Dr. Jaramillo

## 2022-07-17 NOTE — PATIENT PROFILE ADULT - FALL HARM RISK - HARM RISK INTERVENTIONS

## 2022-07-18 ENCOUNTER — TRANSCRIPTION ENCOUNTER (OUTPATIENT)
Age: 37
End: 2022-07-18

## 2022-07-18 LAB
ALBUMIN SERPL ELPH-MCNC: 3.2 G/DL — LOW (ref 3.5–5)
ALP SERPL-CCNC: 111 U/L — SIGNIFICANT CHANGE UP (ref 40–120)
ALT FLD-CCNC: 43 U/L DA — SIGNIFICANT CHANGE UP (ref 10–60)
ANION GAP SERPL CALC-SCNC: 6 MMOL/L — SIGNIFICANT CHANGE UP (ref 5–17)
AST SERPL-CCNC: 13 U/L — SIGNIFICANT CHANGE UP (ref 10–40)
BASOPHILS # BLD AUTO: 0.05 K/UL — SIGNIFICANT CHANGE UP (ref 0–0.2)
BASOPHILS NFR BLD AUTO: 0.4 % — SIGNIFICANT CHANGE UP (ref 0–2)
BILIRUB SERPL-MCNC: 0.8 MG/DL — SIGNIFICANT CHANGE UP (ref 0.2–1.2)
BUN SERPL-MCNC: 14 MG/DL — SIGNIFICANT CHANGE UP (ref 7–18)
CALCIUM SERPL-MCNC: 9.4 MG/DL — SIGNIFICANT CHANGE UP (ref 8.4–10.5)
CHLORIDE SERPL-SCNC: 103 MMOL/L — SIGNIFICANT CHANGE UP (ref 96–108)
CO2 SERPL-SCNC: 29 MMOL/L — SIGNIFICANT CHANGE UP (ref 22–31)
CREAT SERPL-MCNC: 0.87 MG/DL — SIGNIFICANT CHANGE UP (ref 0.5–1.3)
EGFR: 115 ML/MIN/1.73M2 — SIGNIFICANT CHANGE UP
EOSINOPHIL # BLD AUTO: 0.07 K/UL — SIGNIFICANT CHANGE UP (ref 0–0.5)
EOSINOPHIL NFR BLD AUTO: 0.6 % — SIGNIFICANT CHANGE UP (ref 0–6)
GLUCOSE BLDC GLUCOMTR-MCNC: 164 MG/DL — HIGH (ref 70–99)
GLUCOSE BLDC GLUCOMTR-MCNC: 176 MG/DL — HIGH (ref 70–99)
GLUCOSE BLDC GLUCOMTR-MCNC: 183 MG/DL — HIGH (ref 70–99)
GLUCOSE BLDC GLUCOMTR-MCNC: 244 MG/DL — HIGH (ref 70–99)
GLUCOSE SERPL-MCNC: 188 MG/DL — HIGH (ref 70–99)
HCT VFR BLD CALC: 49 % — SIGNIFICANT CHANGE UP (ref 39–50)
HGB BLD-MCNC: 16.5 G/DL — SIGNIFICANT CHANGE UP (ref 13–17)
IMM GRANULOCYTES NFR BLD AUTO: 0.3 % — SIGNIFICANT CHANGE UP (ref 0–1.5)
LYMPHOCYTES # BLD AUTO: 17.5 % — SIGNIFICANT CHANGE UP (ref 13–44)
LYMPHOCYTES # BLD AUTO: 2.02 K/UL — SIGNIFICANT CHANGE UP (ref 1–3.3)
MAGNESIUM SERPL-MCNC: 2.5 MG/DL — SIGNIFICANT CHANGE UP (ref 1.6–2.6)
MCHC RBC-ENTMCNC: 28.9 PG — SIGNIFICANT CHANGE UP (ref 27–34)
MCHC RBC-ENTMCNC: 33.7 GM/DL — SIGNIFICANT CHANGE UP (ref 32–36)
MCV RBC AUTO: 86 FL — SIGNIFICANT CHANGE UP (ref 80–100)
MONOCYTES # BLD AUTO: 0.86 K/UL — SIGNIFICANT CHANGE UP (ref 0–0.9)
MONOCYTES NFR BLD AUTO: 7.5 % — SIGNIFICANT CHANGE UP (ref 2–14)
NEUTROPHILS # BLD AUTO: 8.5 K/UL — HIGH (ref 1.8–7.4)
NEUTROPHILS NFR BLD AUTO: 73.7 % — SIGNIFICANT CHANGE UP (ref 43–77)
NRBC # BLD: 0 /100 WBCS — SIGNIFICANT CHANGE UP (ref 0–0)
PHOSPHATE SERPL-MCNC: 2.9 MG/DL — SIGNIFICANT CHANGE UP (ref 2.5–4.5)
PLATELET # BLD AUTO: 180 K/UL — SIGNIFICANT CHANGE UP (ref 150–400)
POTASSIUM SERPL-MCNC: 4.3 MMOL/L — SIGNIFICANT CHANGE UP (ref 3.5–5.3)
POTASSIUM SERPL-SCNC: 4.3 MMOL/L — SIGNIFICANT CHANGE UP (ref 3.5–5.3)
PROT SERPL-MCNC: 7.3 G/DL — SIGNIFICANT CHANGE UP (ref 6–8.3)
RBC # BLD: 5.7 M/UL — SIGNIFICANT CHANGE UP (ref 4.2–5.8)
RBC # FLD: 13.3 % — SIGNIFICANT CHANGE UP (ref 10.3–14.5)
SODIUM SERPL-SCNC: 138 MMOL/L — SIGNIFICANT CHANGE UP (ref 135–145)
WBC # BLD: 11.53 K/UL — HIGH (ref 3.8–10.5)
WBC # FLD AUTO: 11.53 K/UL — HIGH (ref 3.8–10.5)

## 2022-07-18 PROCEDURE — 99233 SBSQ HOSP IP/OBS HIGH 50: CPT | Mod: GC

## 2022-07-18 RX ORDER — DILTIAZEM HCL 120 MG
240 CAPSULE, EXT RELEASE 24 HR ORAL DAILY
Refills: 0 | Status: DISCONTINUED | OUTPATIENT
Start: 2022-07-18 | End: 2022-07-21

## 2022-07-18 RX ORDER — WARFARIN SODIUM 2.5 MG/1
5 TABLET ORAL ONCE
Refills: 0 | Status: COMPLETED | OUTPATIENT
Start: 2022-07-18 | End: 2022-07-18

## 2022-07-18 RX ADMIN — Medication 2: at 13:19

## 2022-07-18 RX ADMIN — ENOXAPARIN SODIUM 65 MILLIGRAM(S): 100 INJECTION SUBCUTANEOUS at 19:07

## 2022-07-18 RX ADMIN — Medication 240 MILLIGRAM(S): at 09:57

## 2022-07-18 RX ADMIN — Medication 1: at 08:21

## 2022-07-18 RX ADMIN — WARFARIN SODIUM 5 MILLIGRAM(S): 2.5 TABLET ORAL at 22:25

## 2022-07-18 RX ADMIN — Medication 125 MICROGRAM(S): at 05:07

## 2022-07-18 RX ADMIN — ENOXAPARIN SODIUM 65 MILLIGRAM(S): 100 INJECTION SUBCUTANEOUS at 05:07

## 2022-07-18 RX ADMIN — LISINOPRIL 5 MILLIGRAM(S): 2.5 TABLET ORAL at 05:07

## 2022-07-18 RX ADMIN — Medication 1: at 17:15

## 2022-07-18 NOTE — PROGRESS NOTE ADULT - PROBLEM SELECTOR PLAN 1
h/o Afib w/ RVR requiring three prior hospitalizations w/ cardioversions, last hosptialization 2021; non-compliant with medications (previously on coumadin and metoprolol) and loss to f/u >1yr  EKG shows Afib w/ RVR   s/p Cardizem IVP > metoprolol 5 IVP > Cardizem 20 IVP not rate controlled, and then started on Cardizem ggt in ED  CHADSVASC: 3 points with 4.6% risk of stroke, TIA, ans systemic embolism   Admit to tele- Goal rate <110  c/w FD lovenox for now, may need to bridge to warfarin once stable as patient does not have insurance   c/w with cardizem ggt for now for rate control  s/p digoxin loading 500mcg IVPx1 in ED   will hold off on cardioversion as patient has been off AC and is at high risk for embolizing   f/u TTE  Cardio Consulted - Dr. Jaraimllo -h/o Afib w/ RVR requiring three prior hospitalizations w/ cardioversions, last hospitalization 2021; non-compliant with medications (previously on coumadin and metoprolol) and loss to f/u >1yr  -EKG on admission shows Afib w/ RVR   s/p Cardizem IVP > metoprolol 5 IVP > Cardizem 20 IVP not rate controlled, and then started on Cardizem ggt in ED  -CHADSVASC: 2 points with 4.6% risk of stroke, TIA, ans systemic embolism   -Admit to tele- Goal rate <110  -d/c cardizem ggt; switch to oral Diltiazem 240 mg ER for rate control  --s/p digoxin loading 500mcg IVPx1 in ED. D/evelyn digoxin on 7/17.  -will hold off on cardioversion as patient has been off AC and is at high risk for embolizing   -d/c FD lovenox, bridging to Warfarin 5 mg as pt does not have health insurance  -TTE: Normal Left Ventricular Systolic Function,  (EF = 55 to 60%)  -Nuclear stress test performed, wnl  -Cardio Consulted - Dr. Jaramillo: pt spontaneously converted overnight; no need for further inpatient workup, stable for D/C. Pt needs to avoid excessive alcohol use.  -Will be d/evelyn on Warfarin, will obtain INR check w/ PCP, Dr. Veras at [facility], appointment made

## 2022-07-18 NOTE — PROGRESS NOTE ADULT - SUBJECTIVE AND OBJECTIVE BOX
PGY-1 Progress Note discussed with attending    PAGER #: [[phone]] TILL 5:00 PM  PLEASE CONTACT ON CALL TEAM:  - On Call Team (Please refer to Nanda) FROM 5:00 PM - 8:30PM  - Nightfloat Team FROM 8:30 -7:30 AM    CHIEF COMPLAINT & BRIEF HOSPITAL COURSE:    INTERVAL HPI/OVERNIGHT EVENTS:     REVIEW OF SYSTEMS:  CONSTITUTIONAL: No fever, weight loss, or fatigue  RESPIRATORY: No cough, wheezing, chills or hemoptysis; No shortness of breath  CARDIOVASCULAR: No chest pain, palpitations, dizziness, or leg swelling  GASTROINTESTINAL: No abdominal pain. No nausea, vomiting, or hematemesis; No diarrhea or constipation. No melena or hematochezia.  GENITOURINARY: No dysuria or hematuria, urinary frequency  NEUROLOGICAL: No headaches, memory loss, loss of strength, numbness, or tremors  SKIN: No itching, burning, rashes, or lesions     Vital Signs Last 24 Hrs  T(C): 36.4 (18 Jul 2022 07:15), Max: 36.9 (17 Jul 2022 15:38)  T(F): 97.5 (18 Jul 2022 07:15), Max: 98.5 (17 Jul 2022 15:38)  HR: 70 (18 Jul 2022 07:15) (68 - 109)  BP: 104/70 (18 Jul 2022 07:15) (104/70 - 128/86)  BP(mean): 86 (17 Jul 2022 20:10) (86 - 94)  RR: 17 (18 Jul 2022 07:15) (17 - 19)  SpO2: 99% (18 Jul 2022 07:15) (95% - 99%)    Parameters below as of 18 Jul 2022 07:15  Patient On (Oxygen Delivery Method): room air        PHYSICAL EXAMINATION:  GENERAL: NAD, well built  HEAD:  Atraumatic, Normocephalic  EYES:  conjunctiva and sclera clear  NECK: Supple, No JVD, Normal thyroid  CHEST/LUNG: Clear to auscultation. Clear to percussion bilaterally; No rales, rhonchi, wheezing, or rubs  HEART: Regular rate and rhythm; No murmurs, rubs, or gallops  ABDOMEN: Soft, Nontender, Nondistended; Bowel sounds present  NERVOUS SYSTEM:  Alert & Oriented X3,    EXTREMITIES:  2+ Peripheral Pulses, No clubbing, cyanosis, or edema  SKIN: warm dry                          16.5   11.53 )-----------( 180      ( 18 Jul 2022 07:45 )             49.0     07-18    138  |  103  |  14  ----------------------------<  188<H>  4.3   |  29  |  0.87    Ca    9.4      18 Jul 2022 07:45  Phos  2.9     07-18  Mg     2.5     07-18    TPro  7.3  /  Alb  3.2<L>  /  TBili  0.8  /  DBili  x   /  AST  13  /  ALT  43  /  AlkPhos  111  07-18    LIVER FUNCTIONS - ( 18 Jul 2022 07:45 )  Alb: 3.2 g/dL / Pro: 7.3 g/dL / ALK PHOS: 111 U/L / ALT: 43 U/L DA / AST: 13 U/L / GGT: x               PT/INR - ( 16 Jul 2022 18:03 )   PT: 10.7 sec;   INR: 0.90 ratio         PTT - ( 16 Jul 2022 18:03 )  PTT:28.4 sec PGY-1 Progress Note discussed with attending    PAGER #: [[phone]] TILL 5:00 PM  PLEASE CONTACT ON CALL TEAM:  - On Call Team (Please refer to Nanda) FROM 5:00 PM - 8:30PM  - Nightfloat Team FROM 8:30 -7:30 AM    CHIEF COMPLAINT & BRIEF HOSPITAL COURSE:    INTERVAL HPI/OVERNIGHT EVENTS:   Pt was examined at bedside this morning, appears in no acute distress. Pt denies any episodes of chest pain, palpitations, or shortness of breath. Pt was able to sleep comfortably.     REVIEW OF SYSTEMS:  CONSTITUTIONAL: No fever, weight loss, or fatigue  RESPIRATORY: No cough, wheezing, chills or hemoptysis; No shortness of breath  CARDIOVASCULAR: No chest pain, palpitations, dizziness, or leg swelling  GASTROINTESTINAL: No abdominal pain. No nausea, vomiting, or hematemesis; No diarrhea or constipation. No melena or hematochezia.  GENITOURINARY: No dysuria or hematuria, urinary frequency  NEUROLOGICAL: No headaches, memory loss, loss of strength, numbness, or tremors  SKIN: No itching, burning, rashes, or lesions     Vital Signs Last 24 Hrs  T(C): 36.4 (18 Jul 2022 07:15), Max: 36.9 (17 Jul 2022 15:38)  T(F): 97.5 (18 Jul 2022 07:15), Max: 98.5 (17 Jul 2022 15:38)  HR: 70 (18 Jul 2022 07:15) (68 - 109)  BP: 104/70 (18 Jul 2022 07:15) (104/70 - 128/86)  BP(mean): 86 (17 Jul 2022 20:10) (86 - 94)  RR: 17 (18 Jul 2022 07:15) (17 - 19)  SpO2: 99% (18 Jul 2022 07:15) (95% - 99%)    Parameters below as of 18 Jul 2022 07:15  Patient On (Oxygen Delivery Method): room air        PHYSICAL EXAMINATION:  GENERAL: NAD, well built  HEAD:  Atraumatic, Normocephalic  EYES:  conjunctiva and sclera clear  NECK: Supple, No JVD, Normal thyroid  CHEST/LUNG: Clear to auscultation. Clear to percussion bilaterally; No rales, rhonchi, wheezing, or rubs  HEART: Regular rate and rhythm; No murmurs, rubs, or gallops  ABDOMEN: Soft, Nontender, Nondistended; Bowel sounds present  NERVOUS SYSTEM:  Alert & Oriented X3,    EXTREMITIES:  2+ Peripheral Pulses, No clubbing, cyanosis, or edema  SKIN: warm dry                          16.5   11.53 )-----------( 180      ( 18 Jul 2022 07:45 )             49.0     07-18    138  |  103  |  14  ----------------------------<  188<H>  4.3   |  29  |  0.87    Ca    9.4      18 Jul 2022 07:45  Phos  2.9     07-18  Mg     2.5     07-18    TPro  7.3  /  Alb  3.2<L>  /  TBili  0.8  /  DBili  x   /  AST  13  /  ALT  43  /  AlkPhos  111  07-18    LIVER FUNCTIONS - ( 18 Jul 2022 07:45 )  Alb: 3.2 g/dL / Pro: 7.3 g/dL / ALK PHOS: 111 U/L / ALT: 43 U/L DA / AST: 13 U/L / GGT: x               PT/INR - ( 16 Jul 2022 18:03 )   PT: 10.7 sec;   INR: 0.90 ratio         PTT - ( 16 Jul 2022 18:03 )  PTT:28.4 sec

## 2022-07-18 NOTE — DISCHARGE NOTE PROVIDER - NSDCCPCAREPLAN_GEN_ALL_CORE_FT
PRINCIPAL DISCHARGE DIAGNOSIS  Diagnosis: Atrial fibrillation with RVR  Assessment and Plan of Treatment:       SECONDARY DISCHARGE DIAGNOSES  Diagnosis: Hypertension  Assessment and Plan of Treatment:     Diagnosis: Diabetes mellitus  Assessment and Plan of Treatment:      PRINCIPAL DISCHARGE DIAGNOSIS  Diagnosis: Atrial fibrillation with RVR  Assessment and Plan of Treatment: You came to the hospital with palpitaions and chest pain. r      SECONDARY DISCHARGE DIAGNOSES  Diagnosis: Hypertension  Assessment and Plan of Treatment:     Diagnosis: Diabetes mellitus  Assessment and Plan of Treatment:      PRINCIPAL DISCHARGE DIAGNOSIS  Diagnosis: Atrial fibrillation with RVR  Assessment and Plan of Treatment: You were admitted for atrial fibrillation requiring three prior hospitalizations with cardioversions, last hospitalization 2021; non-compliant with medications (previously on coumadin and metoprolol) and loss to follow up for more than a year. EKG on admission shows Atrial fibrillation with rate of 145/min. Echocardiogram showed Normal Left Ventricular Systolic Function (EF = 55 to 60%). Nuclear stress test was normal. In the ED was given Cardizem 10mg IVP, metoprolol 5mg IVP, Cardizem 20mg IVP and not rate controlled, and then started on Cardizem drip which help to control your heart rate. You were started on cardizem drip and we switched to oral Diltiazem 240 mg ER for rate control. You were given digoxin and discontinued later. Your anticogaultion regimen was bridging to Warfarin. Cardiology Dr. Jaramillo consulted. Will be discharged on coumadin 10mg daily. Will need an INR check at [facility] in the next week.      SECONDARY DISCHARGE DIAGNOSES  Diagnosis: Hypertension  Assessment and Plan of Treatment: Pt has history of HTN. You will be discharged on Lisinopril 5 mg.    Diagnosis: Diabetes mellitus  Assessment and Plan of Treatment: Pt has history of type 2 DM on Metformin 1000 mg BID and Glipizide 5 mg daily, which will be continued on discharge. You were treated with insulin sliding scale during the hospital stay.     PRINCIPAL DISCHARGE DIAGNOSIS  Diagnosis: Atrial fibrillation with RVR  Assessment and Plan of Treatment: You were admitted for atrial fibrillation requiring three prior hospitalizations with cardioversions, last hospitalization 2021; non-compliant with medications (previously on coumadin and metoprolol) and loss to follow up for more than a year. EKG on admission shows Atrial fibrillation with rate of 145/min. Echocardiogram showed Normal Left Ventricular Systolic Function (EF = 55 to 60%). Nuclear stress test was normal. In the ED was given Cardizem 10mg IVP, metoprolol 5mg IVP, Cardizem 20mg IVP and not rate controlled, and then started on Cardizem drip which help to control your heart rate. You were started on cardizem drip and we switched to oral Diltiazem 240 mg ER for rate control. You were given digoxin and discontinued later. Your anticogaultion regimen was bridging to Warfarin. Cardiology Dr. Jaramillo consulted. Will be discharged on coumadin 10mg daily. Will need an INR check at [facility] in the next week. Spoke to [facility] and will call tomorrow for the confirmed appointment date and time      SECONDARY DISCHARGE DIAGNOSES  Diagnosis: Hypertension  Assessment and Plan of Treatment: Pt has history of HTN. You will be discharged on Lisinopril 5 mg.    Diagnosis: Diabetes mellitus  Assessment and Plan of Treatment: Pt has history of type 2 DM on Metformin 1000 mg BID and Glipizide 5 mg daily, which will be continued on discharge. You were treated with insulin sliding scale during the hospital stay.

## 2022-07-18 NOTE — PROGRESS NOTE ADULT - PROBLEM SELECTOR PLAN 2
h/o DM on Metformin and Glipizide   will hold oral dm meds  will start on ISS for now  Adjust insulin as indicated  FS ACHS -h/o DM on Metformin and Glipizide   -will hold oral dm meds  - c/w ISS  -Adjust insulin as indicated  -FS ACHS

## 2022-07-18 NOTE — DISCHARGE NOTE PROVIDER - HOSPITAL COURSE
Patient is 37 y/o male, Finnish-speaking with PMHx of DM (on metformin and glipizide), HTN (on lisinopril), and cardiac arrythmia (not currently on AC or rate control meds) and no significant PSHx presenting with acute palpitations with associated chest pain, SOB, and nausea x1d. Cardio Dr. Gonzales consulted by ED provider. Patient admitted to tele for Afib w/ RVR, started on Cardizem gtt and dig loaded. Patient is 37 y/o male, Danish-speaking with PMHx of DM (on metformin and glipizide), HTN (on lisinopril), and cardiac arrythmia (not currently on AC or rate control meds) and no significant PSHx presenting with acute palpitations with associated chest pain, SOB, and nausea x1d. Cardio Dr. Gonzales consulted by ED provider. Patient admitted to tele for Afib w/ RVR, started on Cardizem gtt and dig loaded.    Pt p/w Afib w/ RVR requiring three prior hospitalizations with cardioversions, last hospitalization 2021; non-compliant with medications (previously on coumadin and metoprolol) and loss to follow up for more than a year. EKG on admission shows Afib w/ RVR . in ED was given Cardizem 10mg IVP, metoprolol 5mg IVP, Cardizem 20mg IVP and not rate controlled, and then started on Cardizem ggt in ED  -CHADSVASC: 2 points with 4.6% risk of stroke, TIA, ans systemic embolism. He was started on cardizem drip and we switched to oral Diltiazem 240 mg ER for rate control  --s/p digoxin loading 500mcg IVPx1 in ED. Was discontinued digoxin on 7/17. Pt a bridging to Warfarin 5 mg as pt does not have health insurance. Warfarin dosage increased from 5 mg to 7.5 mg due to subtherapeutic INR level (1.17). Echocardiogram showed Normal Left Ventricular Systolic Function,  (EF = 55 to 60%)  -Nuclear stress test performed, wnl  -Cardio Consulted - Dr. Jaramillo: pt spontaneously converted overnight; no need for further inpatient workup, stable for D/C. Pt needs to avoid excessive alcohol use.  -Will be d/evelyn on Warfarin, will obtain INR check w/ PCP, Dr. Veras at [facility], appointment made  -due to lack of insurance, will need to f/u w/ [facility] meds on discharge.    Pt has history of type 2 DM on Metformin 1000 mg BID and Glipizide 5 mg QD   -will hold oral dm meds  - c/w ISS  -Adjust insulin as indicated  -FS ACHS.     Problem/Plan - 3:  ·  Problem: HTN (hypertension).   ·  Plan: -h/o HTN on Lisinopril   -c/w Lisinopril 5 mg, will hold for parameters   -monitor BP. Patient is 37 y/o male, Kenyan-speaking with PMHx of DM (on metformin and glipizide), HTN (on lisinopril), and cardiac arrythmia (not currently on AC or rate control meds) and no significant PSHx presenting with acute palpitations with associated chest pain, SOB, and nausea x1d. Cardio Dr. Gonzales consulted by ED provider. Was admitted for Afib w/ RVR requiring three prior hospitalizations with cardioversions, last hospitalization 2021; non-compliant with medications (previously on coumadin and metoprolol) and loss to follow up for more than a year. EKG on admission shows Afib w/ RVR . in ED was given Cardizem 10mg IVP, metoprolol 5mg IVP, Cardizem 20mg IVP and not rate controlled, and then started on Cardizem ggt in ED. CHADSVASC: 2 points with 4.6% risk of stroke, TIA, ans systemic embolism. He was started on cardizem drip and we switched to oral Diltiazem 240 mg ER for rate control. Was given digoxin loading 500mcg IVPx1 in ED. Was discontinued on 7/17. Pt bridging to Warfarin as pt does not have health insurance. Echocardiogram showed Normal Left Ventricular Systolic Function,  (EF = 55 to 60%). Nuclear stress test was normal. Cardiology Dr. Jaramillo consulted. Will be discharged on coumadin 10mg daily. Will need an INR check at [facility] in the next week. Pt has history of type 2 DM on Metformin 1000 mg BID and Glipizide 5 mg QD, which will be continued on discharge. Was on insulin sliding scale. Pt has history of HTN. will be discharged on Lisinopril 5 mg.     Patient is stable for discharge per attending and is advised to follow up with PCP as outpatient  Please refer to patient's complete medical chart with documents for a full hospital course, for this is only a brief summary.

## 2022-07-18 NOTE — PROGRESS NOTE ADULT - SUBJECTIVE AND OBJECTIVE BOX
C A R D I O L O G Y  **********************************     DATE OF SERVICE: 07-18-22    Patient denies chest pain or shortness of breath.   Spontaneously converted overnight Review of systems otherwise (-)  	  MEDICATIONS:  MEDICATIONS  (STANDING):  digoxin     Tablet 125 MICROGram(s) Oral daily  diltiazem    milliGRAM(s) Oral daily  enoxaparin Injectable 65 milliGRAM(s) SubCutaneous every 12 hours  insulin lispro (ADMELOG) corrective regimen sliding scale   SubCutaneous three times a day before meals  insulin lispro (ADMELOG) corrective regimen sliding scale   SubCutaneous at bedtime  lisinopril 5 milliGRAM(s) Oral daily      LABS:	 	    CARDIAC MARKERS:        Troponin I, High Sensitivity Result: 33.2 ng/L (07-16-22 @ 18:03)                              16.5   11.53 )-----------( 180      ( 18 Jul 2022 07:45 )             49.0     Hemoglobin: 16.5 g/dL (07-18 @ 07:45)  Hemoglobin: 16.0 g/dL (07-17 @ 07:48)  Hemoglobin: 16.3 g/dL (07-16 @ 18:03)      07-18    138  |  103  |  14  ----------------------------<  188<H>  4.3   |  29  |  0.87    Ca    9.4      18 Jul 2022 07:45  Phos  2.9     07-18  Mg     2.5     07-18    TPro  7.3  /  Alb  3.2<L>  /  TBili  0.8  /  DBili  x   /  AST  13  /  ALT  43  /  AlkPhos  111  07-18    Creatinine Trend: 0.87<--, 0.63<--, 1.25<--        PHYSICAL EXAM:  T(C): 36.6 (07-18-22 @ 13:13), Max: 36.9 (07-17-22 @ 15:38)  HR: 86 (07-18-22 @ 13:13) (68 - 109)  BP: 122/75 (07-18-22 @ 13:13) (104/70 - 128/86)  RR: 19 (07-18-22 @ 13:13) (17 - 19)  SpO2: 94% (07-18-22 @ 13:13) (94% - 99%)  Wt(kg): --  I&O's Summary    17 Jul 2022 07:01  -  18 Jul 2022 07:00  --------------------------------------------------------  IN: 579.8 mL / OUT: 1000 mL / NET: -420.2 mL      HEENT:  (-)icterus (-)pallor  CV: N S1 S2 1/6 DIAMOND (+)2 Pulses B/l  Resp:  Clear to ausculatation B/L, normal effort  GI: (+) BS Soft, NT, ND  Lymph:  (-)Edema, (-)obvious lymphadenopathy  Skin: Warm to touch, Normal turgor  Psych: Appropriate mood and affect      TELEMETRY: 	  Sinus         ASSESSMENT/PLAN: 	36y  Male DM, HTN, Normal LV/RV function, normal Perfusion on Stress new PAF.    - Spontaneously converted overnight  - Please transition to Oral AC (coumadin or NOACC) which ever the patient can afford  - D/C Digoxin, cont Cardizem CD  - No need for further inpatient cardiac work up.  - D/C planning per medical team   - Will need to be set up for INR checks if NOAC not covered as well as Medicine / cardiology clinic  - Can D/C tele    Vick Jaramillo MD, [facility]  BEEPER [phone]       C A R D I O L O G Y  **********************************     DATE OF SERVICE: 07-18-22    Patient denies chest pain or shortness of breath.   Spontaneously converted overnight Review of systems otherwise (-)  	  MEDICATIONS:  MEDICATIONS  (STANDING):  digoxin     Tablet 125 MICROGram(s) Oral daily  diltiazem    milliGRAM(s) Oral daily  enoxaparin Injectable 65 milliGRAM(s) SubCutaneous every 12 hours  insulin lispro (ADMELOG) corrective regimen sliding scale   SubCutaneous three times a day before meals  insulin lispro (ADMELOG) corrective regimen sliding scale   SubCutaneous at bedtime  lisinopril 5 milliGRAM(s) Oral daily      LABS:	 	    CARDIAC MARKERS:        Troponin I, High Sensitivity Result: 33.2 ng/L (07-16-22 @ 18:03)                              16.5   11.53 )-----------( 180      ( 18 Jul 2022 07:45 )             49.0     Hemoglobin: 16.5 g/dL (07-18 @ 07:45)  Hemoglobin: 16.0 g/dL (07-17 @ 07:48)  Hemoglobin: 16.3 g/dL (07-16 @ 18:03)      07-18    138  |  103  |  14  ----------------------------<  188<H>  4.3   |  29  |  0.87    Ca    9.4      18 Jul 2022 07:45  Phos  2.9     07-18  Mg     2.5     07-18    TPro  7.3  /  Alb  3.2<L>  /  TBili  0.8  /  DBili  x   /  AST  13  /  ALT  43  /  AlkPhos  111  07-18    Creatinine Trend: 0.87<--, 0.63<--, 1.25<--        PHYSICAL EXAM:  T(C): 36.6 (07-18-22 @ 13:13), Max: 36.9 (07-17-22 @ 15:38)  HR: 86 (07-18-22 @ 13:13) (68 - 109)  BP: 122/75 (07-18-22 @ 13:13) (104/70 - 128/86)  RR: 19 (07-18-22 @ 13:13) (17 - 19)  SpO2: 94% (07-18-22 @ 13:13) (94% - 99%)  Wt(kg): --  I&O's Summary    17 Jul 2022 07:01  -  18 Jul 2022 07:00  --------------------------------------------------------  IN: 579.8 mL / OUT: 1000 mL / NET: -420.2 mL      HEENT:  (-)icterus (-)pallor  CV: N S1 S2 1/6 DIAMOND (+)2 Pulses B/l  Resp:  Clear to ausculatation B/L, normal effort  GI: (+) BS Soft, NT, ND  Lymph:  (-)Edema, (-)obvious lymphadenopathy  Skin: Warm to touch, Normal turgor  Psych: Appropriate mood and affect      TELEMETRY: 	  Sinus         ASSESSMENT/PLAN: 	36y  Male DM, HTN, Normal LV/RV function, normal Perfusion on Stress new PAF.    - Spontaneously converted overnight  - Please transition to Oral AC (coumadin or NOACC) which ever the patient can afford  - D/C Digoxin, cont Cardizem CD  - No need for further inpatient cardiac work up.  - D/C planning per medical team   - Will need to be set up for INR checks if NOAC not covered as well as Medicine / cardiology clinic  - Needs to abstain from excessive ETOH use   - Can D/C tele    Vick Jaramillo MD, [facility]  BEEPER [phone]

## 2022-07-18 NOTE — DISCHARGE NOTE PROVIDER - NSDCMRMEDTOKEN_GEN_ALL_CORE_FT
atorvastatin 40 mg oral tablet: 1 tab(s) orally once a day  carvedilol 25 mg oral tablet: 1 tab(s) orally 2 times a day  glipiZIDE 5 mg oral tablet: 1 tab(s) orally once a day  lisinopril 20 mg oral tablet: 1 tab(s) orally once a day  metFORMIN 1000 mg oral tablet: 1 tab(s) orally 2 times a day  warfarin 7.5 mg oral tablet: 1 tab(s) orally once a day   atorvastatin 40 mg oral tablet: 1 tab(s) orally once a day  dilTIAZem 240 mg/24 hours oral capsule, extended release: 1 cap(s) orally once a day  glipiZIDE 5 mg oral tablet: 1 tab(s) orally once a day  lisinopril 5 mg oral tablet: 1 tab(s) orally once a day  metFORMIN 1000 mg oral tablet: 1 tab(s) orally 2 times a day  warfarin 7.5 mg oral tablet: 1 tab(s) orally once a day   atorvastatin 40 mg oral tablet: 1 tab(s) orally once a day  dilTIAZem 240 mg/24 hours oral capsule, extended release: 1 cap(s) orally once a day  glipiZIDE 5 mg oral tablet: 1 tab(s) orally once a day  lisinopril 5 mg oral tablet: 1 tab(s) orally once a day  metFORMIN 1000 mg oral tablet: 1 tab(s) orally 2 times a day  warfarin 10 mg oral tablet: 1 tab(s) orally once a day

## 2022-07-18 NOTE — DISCHARGE NOTE PROVIDER - NSDCFUADDAPPT_GEN_ALL_CORE_FT
Dr. Roosevelt Veras MD  [facility]  [facility]  [address] [place] [address]  Ph No: [phone]    Appointment date: On 7/22/2022 at 1pm Dr. Roosevelt Veras MD  [facility]ulatory Care Center  [facility]  [address] [place] [address]  Ph No: [phone]  Next week

## 2022-07-18 NOTE — PROGRESS NOTE ADULT - PROBLEM SELECTOR PLAN 3
h/o HTN on Lisinopril   c/w home meds, will hold for parameters   monitor BP -h/o HTN on Lisinopril   -c/w Lisinopril 5 mg, will hold for parameters   -monitor BP

## 2022-07-19 ENCOUNTER — TRANSCRIPTION ENCOUNTER (OUTPATIENT)
Age: 37
End: 2022-07-19

## 2022-07-19 DIAGNOSIS — F10.10 ALCOHOL ABUSE, UNCOMPLICATED: ICD-10-CM

## 2022-07-19 LAB
ALBUMIN SERPL ELPH-MCNC: 2.9 G/DL — LOW (ref 3.5–5)
ALP SERPL-CCNC: 102 U/L — SIGNIFICANT CHANGE UP (ref 40–120)
ALT FLD-CCNC: 43 U/L DA — SIGNIFICANT CHANGE UP (ref 10–60)
ANION GAP SERPL CALC-SCNC: 9 MMOL/L — SIGNIFICANT CHANGE UP (ref 5–17)
AST SERPL-CCNC: 22 U/L — SIGNIFICANT CHANGE UP (ref 10–40)
BILIRUB SERPL-MCNC: 0.7 MG/DL — SIGNIFICANT CHANGE UP (ref 0.2–1.2)
BUN SERPL-MCNC: 13 MG/DL — SIGNIFICANT CHANGE UP (ref 7–18)
CALCIUM SERPL-MCNC: 8.8 MG/DL — SIGNIFICANT CHANGE UP (ref 8.4–10.5)
CHLORIDE SERPL-SCNC: 104 MMOL/L — SIGNIFICANT CHANGE UP (ref 96–108)
CO2 SERPL-SCNC: 24 MMOL/L — SIGNIFICANT CHANGE UP (ref 22–31)
CREAT SERPL-MCNC: 0.69 MG/DL — SIGNIFICANT CHANGE UP (ref 0.5–1.3)
EGFR: 123 ML/MIN/1.73M2 — SIGNIFICANT CHANGE UP
GLUCOSE BLDC GLUCOMTR-MCNC: 134 MG/DL — HIGH (ref 70–99)
GLUCOSE BLDC GLUCOMTR-MCNC: 154 MG/DL — HIGH (ref 70–99)
GLUCOSE BLDC GLUCOMTR-MCNC: 180 MG/DL — HIGH (ref 70–99)
GLUCOSE BLDC GLUCOMTR-MCNC: 185 MG/DL — HIGH (ref 70–99)
GLUCOSE SERPL-MCNC: 155 MG/DL — HIGH (ref 70–99)
HCT VFR BLD CALC: 48.8 % — SIGNIFICANT CHANGE UP (ref 39–50)
HGB BLD-MCNC: 16.4 G/DL — SIGNIFICANT CHANGE UP (ref 13–17)
INR BLD: 1.07 RATIO — SIGNIFICANT CHANGE UP (ref 0.88–1.16)
MAGNESIUM SERPL-MCNC: 2.4 MG/DL — SIGNIFICANT CHANGE UP (ref 1.6–2.6)
MCHC RBC-ENTMCNC: 29.3 PG — SIGNIFICANT CHANGE UP (ref 27–34)
MCHC RBC-ENTMCNC: 33.6 GM/DL — SIGNIFICANT CHANGE UP (ref 32–36)
MCV RBC AUTO: 87.3 FL — SIGNIFICANT CHANGE UP (ref 80–100)
NRBC # BLD: 0 /100 WBCS — SIGNIFICANT CHANGE UP (ref 0–0)
PHOSPHATE SERPL-MCNC: 3.1 MG/DL — SIGNIFICANT CHANGE UP (ref 2.5–4.5)
PLATELET # BLD AUTO: 174 K/UL — SIGNIFICANT CHANGE UP (ref 150–400)
POTASSIUM SERPL-MCNC: 3.6 MMOL/L — SIGNIFICANT CHANGE UP (ref 3.5–5.3)
POTASSIUM SERPL-SCNC: 3.6 MMOL/L — SIGNIFICANT CHANGE UP (ref 3.5–5.3)
PROT SERPL-MCNC: 7.1 G/DL — SIGNIFICANT CHANGE UP (ref 6–8.3)
PROTHROM AB SERPL-ACNC: 12.7 SEC — SIGNIFICANT CHANGE UP (ref 10.5–13.4)
RBC # BLD: 5.59 M/UL — SIGNIFICANT CHANGE UP (ref 4.2–5.8)
RBC # FLD: 13.2 % — SIGNIFICANT CHANGE UP (ref 10.3–14.5)
SODIUM SERPL-SCNC: 137 MMOL/L — SIGNIFICANT CHANGE UP (ref 135–145)
WBC # BLD: 9.49 K/UL — SIGNIFICANT CHANGE UP (ref 3.8–10.5)
WBC # FLD AUTO: 9.49 K/UL — SIGNIFICANT CHANGE UP (ref 3.8–10.5)

## 2022-07-19 PROCEDURE — 99233 SBSQ HOSP IP/OBS HIGH 50: CPT | Mod: GC

## 2022-07-19 RX ORDER — WARFARIN SODIUM 2.5 MG/1
7.5 TABLET ORAL ONCE
Refills: 0 | Status: COMPLETED | OUTPATIENT
Start: 2022-07-19 | End: 2022-07-19

## 2022-07-19 RX ADMIN — Medication 240 MILLIGRAM(S): at 05:55

## 2022-07-19 RX ADMIN — ENOXAPARIN SODIUM 65 MILLIGRAM(S): 100 INJECTION SUBCUTANEOUS at 17:20

## 2022-07-19 RX ADMIN — ENOXAPARIN SODIUM 65 MILLIGRAM(S): 100 INJECTION SUBCUTANEOUS at 05:56

## 2022-07-19 RX ADMIN — LISINOPRIL 5 MILLIGRAM(S): 2.5 TABLET ORAL at 05:55

## 2022-07-19 RX ADMIN — Medication 1: at 12:15

## 2022-07-19 RX ADMIN — WARFARIN SODIUM 7.5 MILLIGRAM(S): 2.5 TABLET ORAL at 23:35

## 2022-07-19 RX ADMIN — Medication 1: at 08:19

## 2022-07-19 NOTE — PROGRESS NOTE ADULT - SUBJECTIVE AND OBJECTIVE BOX
C A R D I O L O G Y  **********************************     DATE OF SERVICE: 07-19-22    Patient denies chest pain or shortness of breath.   Review of symptoms otherwise negative.    acetaminophen     Tablet .. 650 milliGRAM(s) Oral every 6 hours PRN  diltiazem    milliGRAM(s) Oral daily  enoxaparin Injectable 65 milliGRAM(s) SubCutaneous every 12 hours  insulin lispro (ADMELOG) corrective regimen sliding scale   SubCutaneous three times a day before meals  insulin lispro (ADMELOG) corrective regimen sliding scale   SubCutaneous at bedtime  lisinopril 5 milliGRAM(s) Oral daily  warfarin 7.5 milliGRAM(s) Oral once                            16.4   9.49  )-----------( 174      ( 19 Jul 2022 06:18 )             48.8       Hemoglobin: 16.4 g/dL (07-19 @ 06:18)  Hemoglobin: 16.5 g/dL (07-18 @ 07:45)  Hemoglobin: 16.0 g/dL (07-17 @ 07:48)  Hemoglobin: 16.3 g/dL (07-16 @ 18:03)      07-19    137  |  104  |  13  ----------------------------<  155<H>  3.6   |  24  |  0.69    Ca    8.8      19 Jul 2022 06:18  Phos  3.1     07-19  Mg     2.4     07-19    TPro  7.1  /  Alb  2.9<L>  /  TBili  0.7  /  DBili  x   /  AST  22  /  ALT  43  /  AlkPhos  102  07-19    Creatinine Trend: 0.69<--, 0.87<--, 0.63<--, 1.25<--    COAGS: PT/INR - ( 19 Jul 2022 06:18 )   PT: 12.7 sec;   INR: 1.07 ratio          T(C): 36.6 (07-19-22 @ 11:32), Max: 36.9 (07-18-22 @ 23:56)  HR: 66 (07-19-22 @ 11:32) (62 - 86)  BP: 113/63 (07-19-22 @ 11:32) (76/- - 122/75)  RR: 18 (07-19-22 @ 11:32) (18 - 19)  SpO2: 69% (07-19-22 @ 11:32) (69% - 99%)  Wt(kg): --    I&O's Summary      HEENT:  (-)icterus (-)pallor  CV: N S1 S2 1/6 DIAMOND (+)2 Pulses B/l  Resp:  Clear to ausculatation B/L, normal effort  GI: (+) BS Soft, NT, ND  Lymph:  (-)Edema, (-)obvious lymphadenopathy  Skin: Warm to touch, Normal turgor  Psych: Appropriate mood and affect      TELEMETRY: 	  Sinus         ASSESSMENT/PLAN: 	36y  Male DM, HTN, Normal LV/RV function, normal Perfusion on Stress new PAF.    - Spontaneously converted overnight  - Please transition to Oral AC (coumadin or NOACC) which ever the patient can afford  - D/C Digoxin, cont Cardizem CD  - No need for further inpatient cardiac work up.  - D/C planning per medical team   - Will need to be set up for INR checks if NOAC not covered as well as Medicine / cardiology clinic  - Needs to abstain from excessive ETOH use   - Can D/C tele  - No need for further inpatient cardiac work up.      Vick Jaramillo MD, [facility]  BEEPER [phone]       C A R D I O L O G Y  **********************************     DATE OF SERVICE: 07-19-22    Patient denies chest pain or shortness of breath.   Review of symptoms otherwise negative.    acetaminophen     Tablet .. 650 milliGRAM(s) Oral every 6 hours PRN  diltiazem    milliGRAM(s) Oral daily  enoxaparin Injectable 65 milliGRAM(s) SubCutaneous every 12 hours  insulin lispro (ADMELOG) corrective regimen sliding scale   SubCutaneous three times a day before meals  insulin lispro (ADMELOG) corrective regimen sliding scale   SubCutaneous at bedtime  lisinopril 5 milliGRAM(s) Oral daily  warfarin 7.5 milliGRAM(s) Oral once                            16.4   9.49  )-----------( 174      ( 19 Jul 2022 06:18 )             48.8       Hemoglobin: 16.4 g/dL (07-19 @ 06:18)  Hemoglobin: 16.5 g/dL (07-18 @ 07:45)  Hemoglobin: 16.0 g/dL (07-17 @ 07:48)  Hemoglobin: 16.3 g/dL (07-16 @ 18:03)      07-19    137  |  104  |  13  ----------------------------<  155<H>  3.6   |  24  |  0.69    Ca    8.8      19 Jul 2022 06:18  Phos  3.1     07-19  Mg     2.4     07-19    TPro  7.1  /  Alb  2.9<L>  /  TBili  0.7  /  DBili  x   /  AST  22  /  ALT  43  /  AlkPhos  102  07-19    Creatinine Trend: 0.69<--, 0.87<--, 0.63<--, 1.25<--    COAGS: PT/INR - ( 19 Jul 2022 06:18 )   PT: 12.7 sec;   INR: 1.07 ratio          T(C): 36.6 (07-19-22 @ 11:32), Max: 36.9 (07-18-22 @ 23:56)  HR: 66 (07-19-22 @ 11:32) (62 - 86)  BP: 113/63 (07-19-22 @ 11:32) (76/- - 122/75)  RR: 18 (07-19-22 @ 11:32) (18 - 19)  SpO2: 69% (07-19-22 @ 11:32) (69% - 99%)  Wt(kg): --    I&O's Summary      HEENT:  (-)icterus (-)pallor  CV: N S1 S2 1/6 DIAMOND (+)2 Pulses B/l  Resp:  Clear to ausculatation B/L, normal effort  GI: (+) BS Soft, NT, ND  Lymph:  (-)Edema, (-)obvious lymphadenopathy  Skin: Warm to touch, Normal turgor  Psych: Appropriate mood and affect      TELEMETRY: 	  Sinus         ASSESSMENT/PLAN: 	36y  Male DM, HTN, Normal LV/RV function, normal Perfusion on Stress new PAF.    - Spontaneously converted overnight  - Please transition to Oral AC (coumadin or NOACC) which ever the patient can afford  - D/C Digoxin, cont Cardizem CD  - No need for further inpatient cardiac work up.  - D/C planning per medical team   - Will need to be set up for INR checks if NOAC not covered as well as Medicine / cardiology clinic  - Needs to abstain from excessive ETOH use   - Can D/C tele  - No need for further inpatient cardiac work up.  - I will sign off please call back if needed      Vick Jaramillo MD, [facility]  BEEPER [phone]

## 2022-07-19 NOTE — PROGRESS NOTE ADULT - PROBLEM SELECTOR PLAN 2
-h/o DM on Metformin and Glipizide   -will hold oral dm meds  - c/w ISS  -Adjust insulin as indicated  -FS ACHS -h/o DM on Metformin 1000 mg BID and Glipizide 5 mg QD   -will hold oral dm meds  - c/w ISS  -Adjust insulin as indicated  -FS ACHS

## 2022-07-19 NOTE — PROGRESS NOTE ADULT - SUBJECTIVE AND OBJECTIVE BOX
PGY-3 Progress Note discussed with attending    PAGER #: [[phone]] TILL 5:00 PM  PLEASE CONTACT ON CALL TEAM:  - On Call Team (Please refer to Nanda) FROM 5:00 PM - 8:30PM  - Nightfloat Team FROM 8:30 -7:30 AM    CHIEF COMPLAINT & BRIEF HOSPITAL COURSE:    INTERVAL HPI/OVERNIGHT EVENTS:     REVIEW OF SYSTEMS:  CONSTITUTIONAL: No fever, weight loss, or fatigue  RESPIRATORY: No cough, wheezing, chills or hemoptysis; No shortness of breath  CARDIOVASCULAR: No chest pain, palpitations, dizziness, or leg swelling  GASTROINTESTINAL: No abdominal pain. No nausea, vomiting, or hematemesis; No diarrhea or constipation. No melena or hematochezia.  GENITOURINARY: No dysuria or hematuria, urinary frequency  NEUROLOGICAL: No headaches, memory loss, loss of strength, numbness, or tremors  SKIN: No itching, burning, rashes, or lesions     Vital Signs Last 24 Hrs  T(C): 36.6 (19 Jul 2022 04:28), Max: 36.9 (18 Jul 2022 23:56)  T(F): 97.8 (19 Jul 2022 04:28), Max: 98.5 (18 Jul 2022 23:56)  HR: 66 (19 Jul 2022 04:28) (62 - 86)  BP: 117/73 (19 Jul 2022 04:28) (76/- - 122/75)  BP(mean): --  RR: 18 (19 Jul 2022 04:28) (18 - 19)  SpO2: 98% (19 Jul 2022 04:28) (94% - 99%)    Parameters below as of 19 Jul 2022 04:28  Patient On (Oxygen Delivery Method): room air        PHYSICAL EXAMINATION:  GENERAL: NAD, well built  HEAD:  Atraumatic, Normocephalic  EYES:  conjunctiva and sclera clear  NECK: Supple, No JVD, Normal thyroid  CHEST/LUNG: Clear to auscultation. Clear to percussion bilaterally; No rales, rhonchi, wheezing, or rubs  HEART: Regular rate and rhythm; No murmurs, rubs, or gallops  ABDOMEN: Soft, Nontender, Nondistended; Bowel sounds present  NERVOUS SYSTEM:  Alert & Oriented X3,    EXTREMITIES:  2+ Peripheral Pulses, No clubbing, cyanosis, or edema  SKIN: warm dry                          16.5   11.53 )-----------( 180      ( 18 Jul 2022 07:45 )             49.0     07-18    138  |  103  |  14  ----------------------------<  188<H>  4.3   |  29  |  0.87    Ca    9.4      18 Jul 2022 07:45  Phos  2.9     07-18  Mg     2.5     07-18    TPro  7.3  /  Alb  3.2<L>  /  TBili  0.8  /  DBili  x   /  AST  13  /  ALT  43  /  AlkPhos  111  07-18    LIVER FUNCTIONS - ( 18 Jul 2022 07:45 )  Alb: 3.2 g/dL / Pro: 7.3 g/dL / ALK PHOS: 111 U/L / ALT: 43 U/L DA / AST: 13 U/L / GGT: x               PT/INR - ( 19 Jul 2022 06:18 )   PT: 12.7 sec;   INR: 1.07 ratio                     PGY-3 Progress Note discussed with attending    PAGER #: [[phone]] TILL 5:00 PM  PLEASE CONTACT ON CALL TEAM:  - On Call Team (Please refer to Nanda) FROM 5:00 PM - 8:30PM  - Nightfloat Team FROM 8:30 -7:30 AM    CHIEF COMPLAINT & BRIEF HOSPITAL COURSE:    INTERVAL HPI/OVERNIGHT EVENTS:   Pt was examined at bedside this morning. Pt denies chest pain, palpitations, or shortness of breath. Pt states that he was able to sleep comfortably. No acute events on telemetry overnight.    REVIEW OF SYSTEMS:  CONSTITUTIONAL: No fever, weight loss, or fatigue  RESPIRATORY: No cough, wheezing, chills or hemoptysis; No shortness of breath  CARDIOVASCULAR: No chest pain, palpitations, dizziness, or leg swelling  GASTROINTESTINAL: No abdominal pain. No nausea, vomiting, or hematemesis; No diarrhea or constipation. No melena or hematochezia.  GENITOURINARY: No dysuria or hematuria, urinary frequency  NEUROLOGICAL: No headaches, memory loss, loss of strength, numbness, or tremors  SKIN: No itching, burning, rashes, or lesions     Vital Signs Last 24 Hrs  T(C): 36.6 (19 Jul 2022 04:28), Max: 36.9 (18 Jul 2022 23:56)  T(F): 97.8 (19 Jul 2022 04:28), Max: 98.5 (18 Jul 2022 23:56)  HR: 66 (19 Jul 2022 04:28) (62 - 86)  BP: 117/73 (19 Jul 2022 04:28) (76/- - 122/75)  BP(mean): --  RR: 18 (19 Jul 2022 04:28) (18 - 19)  SpO2: 98% (19 Jul 2022 04:28) (94% - 99%)    Parameters below as of 19 Jul 2022 04:28  Patient On (Oxygen Delivery Method): room air        PHYSICAL EXAMINATION:  GENERAL: NAD, well built  HEAD:  Atraumatic, Normocephalic  EYES:  conjunctiva and sclera clear  NECK: Supple, No JVD, Normal thyroid  CHEST/LUNG: Clear to auscultation. Clear to percussion bilaterally; No rales, rhonchi, wheezing, or rubs  HEART: Regular rate and rhythm; No murmurs, rubs, or gallops  ABDOMEN: Soft, Nontender, Nondistended; Bowel sounds present  NERVOUS SYSTEM:  Alert & Oriented X3,    EXTREMITIES:  2+ Peripheral Pulses, No clubbing, cyanosis, or edema  SKIN: warm dry                          16.5   11.53 )-----------( 180      ( 18 Jul 2022 07:45 )             49.0     07-18    138  |  103  |  14  ----------------------------<  188<H>  4.3   |  29  |  0.87    Ca    9.4      18 Jul 2022 07:45  Phos  2.9     07-18  Mg     2.5     07-18    TPro  7.3  /  Alb  3.2<L>  /  TBili  0.8  /  DBili  x   /  AST  13  /  ALT  43  /  AlkPhos  111  07-18    LIVER FUNCTIONS - ( 18 Jul 2022 07:45 )  Alb: 3.2 g/dL / Pro: 7.3 g/dL / ALK PHOS: 111 U/L / ALT: 43 U/L DA / AST: 13 U/L / GGT: x               PT/INR - ( 19 Jul 2022 06:18 )   PT: 12.7 sec;   INR: 1.07 ratio

## 2022-07-19 NOTE — DISCHARGE NOTE NURSING/CASE MANAGEMENT/SOCIAL WORK - PATIENT PORTAL LINK FT
You can access the FollowMyHealth Patient Portal offered by [facility] by registering at the following website: [email or web address]. By joining [facility]’s FollowMyHealth portal, you will also be able to view your health information using other applications (apps) compatible with our system.

## 2022-07-19 NOTE — DISCHARGE NOTE NURSING/CASE MANAGEMENT/SOCIAL WORK - NSDCFUADDAPPT_GEN_ALL_CORE_FT
Dr. Roosevelt Veras MD  [facility]  [address] [place] [address]  Ph No: [phone]    Appointment date: On 7/22/2022 at 1pm

## 2022-07-19 NOTE — DISCHARGE NOTE NURSING/CASE MANAGEMENT/SOCIAL WORK - NSDCPEFALRISK_GEN_ALL_CORE
For information on Fall & Injury Prevention, visit: [email or web address] OR  [email or web address] OR  https://www.cdc.gov/steadi/patient.html

## 2022-07-19 NOTE — PROGRESS NOTE ADULT - PROBLEM SELECTOR PLAN 1
-h/o Afib w/ RVR requiring three prior hospitalizations w/ cardioversions, last hospitalization 2021; non-compliant with medications (previously on coumadin and metoprolol) and loss to f/u >1yr  -EKG on admission shows Afib w/ RVR   s/p Cardizem IVP > metoprolol 5 IVP > Cardizem 20 IVP not rate controlled, and then started on Cardizem ggt in ED  -CHADSVASC: 2 points with 4.6% risk of stroke, TIA, ans systemic embolism   -Admit to tele- Goal rate <110  -d/c cardizem ggt; switch to oral Diltiazem 240 mg ER for rate control  --s/p digoxin loading 500mcg IVPx1 in ED. D/evelyn digoxin on 7/17.  -will hold off on cardioversion as patient has been off AC and is at high risk for embolizing   -d/c FD lovenox, bridging to Warfarin 5 mg as pt does not have health insurance  -TTE: Normal Left Ventricular Systolic Function,  (EF = 55 to 60%)  -Nuclear stress test performed, wnl  -Cardio Consulted - Dr. Jaramillo: pt spontaneously converted overnight; no need for further inpatient workup, stable for D/C. Pt needs to avoid excessive alcohol use.  -Will be d/evelyn on Warfarin, will obtain INR check w/ PCP, Dr. Veras at [facility], appointment made -h/o Afib w/ RVR requiring three prior hospitalizations w/ cardioversions, last hospitalization 2021; non-compliant with medications (previously on coumadin and metoprolol) and loss to f/u >1yr  -EKG on admission shows Afib w/ RVR   s/p Cardizem IVP > metoprolol 5 IVP > Cardizem 20 IVP not rate controlled, and then started on Cardizem ggt in ED  -CHADSVASC: 2 points with 4.6% risk of stroke, TIA, ans systemic embolism   -Admit to tele- Goal rate <110  -d/c cardizem ggt; switch to oral Diltiazem 240 mg ER for rate control  --s/p digoxin loading 500mcg IVPx1 in ED. D/evelyn digoxin on 7/17.  -will hold off on cardioversion as patient has been off AC and is at high risk for embolizing   -d/c FD lovenox, bridging to Warfarin 5 mg as pt does not have health insurance. Warfarin dosage increased from 5 mg to 7.5 mg due to subtherapeutic INR level (1.07)  -TTE: Normal Left Ventricular Systolic Function,  (EF = 55 to 60%)  -Nuclear stress test performed, wnl  -Cardio Consulted - Dr. Jaramillo: pt spontaneously converted overnight; no need for further inpatient workup, stable for D/C. Pt needs to avoid excessive alcohol use.  -Will be d/evelyn on Warfarin, will obtain INR check w/ PCP, Dr. Veras at [facility], appointment made  -due to lack of insurance, will need to f/u w/ VIVO meds on discharge.

## 2022-07-20 LAB
ANION GAP SERPL CALC-SCNC: 9 MMOL/L — SIGNIFICANT CHANGE UP (ref 5–17)
BUN SERPL-MCNC: 18 MG/DL — SIGNIFICANT CHANGE UP (ref 7–18)
CALCIUM SERPL-MCNC: 9.3 MG/DL — SIGNIFICANT CHANGE UP (ref 8.4–10.5)
CHLORIDE SERPL-SCNC: 104 MMOL/L — SIGNIFICANT CHANGE UP (ref 96–108)
CO2 SERPL-SCNC: 25 MMOL/L — SIGNIFICANT CHANGE UP (ref 22–31)
CREAT SERPL-MCNC: 0.68 MG/DL — SIGNIFICANT CHANGE UP (ref 0.5–1.3)
EGFR: 124 ML/MIN/1.73M2 — SIGNIFICANT CHANGE UP
GLUCOSE BLDC GLUCOMTR-MCNC: 142 MG/DL — HIGH (ref 70–99)
GLUCOSE BLDC GLUCOMTR-MCNC: 163 MG/DL — HIGH (ref 70–99)
GLUCOSE BLDC GLUCOMTR-MCNC: 180 MG/DL — HIGH (ref 70–99)
GLUCOSE BLDC GLUCOMTR-MCNC: 226 MG/DL — HIGH (ref 70–99)
GLUCOSE SERPL-MCNC: 146 MG/DL — HIGH (ref 70–99)
HCT VFR BLD CALC: 47 % — SIGNIFICANT CHANGE UP (ref 39–50)
HGB BLD-MCNC: 16 G/DL — SIGNIFICANT CHANGE UP (ref 13–17)
INR BLD: 1.17 RATIO — HIGH (ref 0.88–1.16)
MAGNESIUM SERPL-MCNC: 2.5 MG/DL — SIGNIFICANT CHANGE UP (ref 1.6–2.6)
MCHC RBC-ENTMCNC: 29.2 PG — SIGNIFICANT CHANGE UP (ref 27–34)
MCHC RBC-ENTMCNC: 34 GM/DL — SIGNIFICANT CHANGE UP (ref 32–36)
MCV RBC AUTO: 85.8 FL — SIGNIFICANT CHANGE UP (ref 80–100)
NRBC # BLD: 0 /100 WBCS — SIGNIFICANT CHANGE UP (ref 0–0)
PHOSPHATE SERPL-MCNC: 3.3 MG/DL — SIGNIFICANT CHANGE UP (ref 2.5–4.5)
PLATELET # BLD AUTO: 172 K/UL — SIGNIFICANT CHANGE UP (ref 150–400)
POTASSIUM SERPL-MCNC: 3.6 MMOL/L — SIGNIFICANT CHANGE UP (ref 3.5–5.3)
POTASSIUM SERPL-SCNC: 3.6 MMOL/L — SIGNIFICANT CHANGE UP (ref 3.5–5.3)
PROTHROM AB SERPL-ACNC: 13.9 SEC — HIGH (ref 10.5–13.4)
RBC # BLD: 5.48 M/UL — SIGNIFICANT CHANGE UP (ref 4.2–5.8)
RBC # FLD: 13.2 % — SIGNIFICANT CHANGE UP (ref 10.3–14.5)
SODIUM SERPL-SCNC: 138 MMOL/L — SIGNIFICANT CHANGE UP (ref 135–145)
WBC # BLD: 8.04 K/UL — SIGNIFICANT CHANGE UP (ref 3.8–10.5)
WBC # FLD AUTO: 8.04 K/UL — SIGNIFICANT CHANGE UP (ref 3.8–10.5)

## 2022-07-20 PROCEDURE — 99233 SBSQ HOSP IP/OBS HIGH 50: CPT | Mod: GC

## 2022-07-20 RX ORDER — WARFARIN SODIUM 2.5 MG/1
10 TABLET ORAL ONCE
Refills: 0 | Status: COMPLETED | OUTPATIENT
Start: 2022-07-20 | End: 2022-07-20

## 2022-07-20 RX ADMIN — Medication 240 MILLIGRAM(S): at 06:10

## 2022-07-20 RX ADMIN — Medication 1: at 16:42

## 2022-07-20 RX ADMIN — ENOXAPARIN SODIUM 65 MILLIGRAM(S): 100 INJECTION SUBCUTANEOUS at 06:11

## 2022-07-20 RX ADMIN — Medication 2: at 11:46

## 2022-07-20 RX ADMIN — LISINOPRIL 5 MILLIGRAM(S): 2.5 TABLET ORAL at 06:10

## 2022-07-20 RX ADMIN — ENOXAPARIN SODIUM 65 MILLIGRAM(S): 100 INJECTION SUBCUTANEOUS at 18:10

## 2022-07-20 NOTE — PROGRESS NOTE ADULT - PROBLEM SELECTOR PLAN 1
-h/o Afib w/ RVR requiring three prior hospitalizations w/ cardioversions, last hospitalization 2021; non-compliant with medications (previously on coumadin and metoprolol) and loss to f/u >1yr  -EKG on admission shows Afib w/ RVR   s/p Cardizem IVP > metoprolol 5 IVP > Cardizem 20 IVP not rate controlled, and then started on Cardizem ggt in ED  -CHADSVASC: 2 points with 4.6% risk of stroke, TIA, ans systemic embolism   -Admit to tele- Goal rate <110  -d/c cardizem ggt; switch to oral Diltiazem 240 mg ER for rate control  --s/p digoxin loading 500mcg IVPx1 in ED. D/evelyn digoxin on 7/17.  -will hold off on cardioversion as patient has been off AC and is at high risk for embolizing   -d/c FD lovenox, bridging to Warfarin 5 mg as pt does not have health insurance. Warfarin dosage increased from 5 mg to 7.5 mg due to subtherapeutic INR level (1.07)  -TTE: Normal Left Ventricular Systolic Function,  (EF = 55 to 60%)  -Nuclear stress test performed, wnl  -Cardio Consulted - Dr. Jaramillo: pt spontaneously converted overnight; no need for further inpatient workup, stable for D/C. Pt needs to avoid excessive alcohol use.  -Will be d/evelyn on Warfarin, will obtain INR check w/ PCP, Dr. Veras at [facility], appointment made  -due to lack of insurance, will need to f/u w/ VIVO meds on discharge. -h/o Afib w/ RVR requiring three prior hospitalizations w/ cardioversions, last hospitalization 2021; non-compliant with medications (previously on coumadin and metoprolol) and loss to f/u >1yr  -EKG on admission shows Afib w/ RVR   s/p Cardizem IVP > metoprolol 5 IVP > Cardizem 20 IVP not rate controlled, and then started on Cardizem ggt in ED  -CHADSVASC: 2 points with 4.6% risk of stroke, TIA, ans systemic embolism   -Admit to tele- Goal rate <110  -d/c cardizem ggt; switch to oral Diltiazem 240 mg ER for rate control  --s/p digoxin loading 500mcg IVPx1 in ED. D/evelyn digoxin on 7/17.  -will hold off on cardioversion as patient has been off AC and is at high risk for embolizing   -d/c FD lovenox, bridging to Warfarin 5 mg as pt does not have health insurance. Warfarin dosage increased from 5 mg to 7.5 mg due to subtherapeutic INR level (1.17)  -TTE: Normal Left Ventricular Systolic Function,  (EF = 55 to 60%)  -Nuclear stress test performed, wnl  -Cardio Consulted - Dr. Jaramillo: pt spontaneously converted overnight; no need for further inpatient workup, stable for D/C. Pt needs to avoid excessive alcohol use.  -Will be d/evelyn on Warfarin, will obtain INR check w/ PCP, Dr. Veras at [facility], appointment made  -due to lack of insurance, will need to f/u w/ VIVO meds on discharge.

## 2022-07-20 NOTE — PROGRESS NOTE ADULT - PROBLEM SELECTOR PLAN 2
-h/o DM on Metformin 1000 mg BID and Glipizide 5 mg QD   -will hold oral dm meds  - c/w ISS  -Adjust insulin as indicated  -FS ACHS

## 2022-07-20 NOTE — PROGRESS NOTE ADULT - SUBJECTIVE AND OBJECTIVE BOX
PGY-3 Progress Note discussed with attending    PAGER #: [[phone]] TILL 5:00 PM  PLEASE CONTACT ON CALL TEAM:  - On Call Team (Please refer to Nanda) FROM 5:00 PM - 8:30PM  - Nightfloat Team FROM 8:30 -7:30 AM    INTERVAL HPI/OVERNIGHT EVENTS:   Pt was examined at bedside this morning. Pt denies chest pain, palpitations, or shortness of breath. Pt states that he was able to sleep comfortably. No acute events on telemetry overnight.    REVIEW OF SYSTEMS:  CONSTITUTIONAL: No fever, weight loss, or fatigue  RESPIRATORY: No cough, wheezing, chills or hemoptysis; No shortness of breath  CARDIOVASCULAR: No chest pain, palpitations, dizziness, or leg swelling  GASTROINTESTINAL: No abdominal pain. No nausea, vomiting, or hematemesis; No diarrhea or constipation. No melena or hematochezia.  GENITOURINARY: No dysuria or hematuria, urinary frequency  NEUROLOGICAL: No headaches, memory loss, loss of strength, numbness, or tremors  SKIN: No itching, burning, rashes, or lesions     Vital Signs Last 24 Hrs  T(C): 36.8 (20 Jul 2022 04:42), Max: 37.1 (19 Jul 2022 20:10)  T(F): 98.2 (20 Jul 2022 04:42), Max: 98.7 (19 Jul 2022 20:10)  HR: 84 (20 Jul 2022 04:42) (65 - 84)  BP: 121/69 (20 Jul 2022 04:42) (106/66 - 121/69)  BP(mean): --  RR: 18 (20 Jul 2022 04:42) (18 - 18)  SpO2: 97% (20 Jul 2022 04:42) (69% - 98%)    Parameters below as of 20 Jul 2022 04:42  Patient On (Oxygen Delivery Method): room air    PHYSICAL EXAMINATION:  GENERAL: NAD, well built  HEAD:  Atraumatic, Normocephalic  EYES:  conjunctiva and sclera clear  NECK: Supple, No JVD, Normal thyroid  CHEST/LUNG: Clear to auscultation. Clear to percussion bilaterally; No rales, rhonchi, wheezing, or rubs  HEART: Regular rate and rhythm; No murmurs, rubs, or gallops  ABDOMEN: Soft, Nontender, Nondistended; Bowel sounds present  NERVOUS SYSTEM:  Alert & Oriented X3,    EXTREMITIES:  2+ Peripheral Pulses, No clubbing, cyanosis, or edema  SKIN: warm dry                          16.0   8.04  )-----------( 172      ( 20 Jul 2022 05:40 )             47.0     07-20    138  |  104  |  18  ----------------------------<  146<H>  3.6   |  25  |  0.68    Ca    9.3      20 Jul 2022 05:40  Phos  3.3     07-20  Mg     2.5     07-20    TPro  7.1  /  Alb  2.9<L>  /  TBili  0.7  /  DBili  x   /  AST  22  /  ALT  43  /  AlkPhos  102  07-19    LIVER FUNCTIONS - ( 19 Jul 2022 06:18 )  Alb: 2.9 g/dL / Pro: 7.1 g/dL / ALK PHOS: 102 U/L / ALT: 43 U/L DA / AST: 22 U/L / GGT: x               PT/INR - ( 20 Jul 2022 05:40 )   PT: 13.9 sec;   INR: 1.17 ratio

## 2022-07-21 VITALS
OXYGEN SATURATION: 96 % | TEMPERATURE: 99 F | RESPIRATION RATE: 18 BRPM | HEART RATE: 91 BPM | SYSTOLIC BLOOD PRESSURE: 121 MMHG | DIASTOLIC BLOOD PRESSURE: 78 MMHG

## 2022-07-21 LAB
ANION GAP SERPL CALC-SCNC: 10 MMOL/L — SIGNIFICANT CHANGE UP (ref 5–17)
BUN SERPL-MCNC: 15 MG/DL — SIGNIFICANT CHANGE UP (ref 7–18)
CALCIUM SERPL-MCNC: 9.3 MG/DL — SIGNIFICANT CHANGE UP (ref 8.4–10.5)
CHLORIDE SERPL-SCNC: 105 MMOL/L — SIGNIFICANT CHANGE UP (ref 96–108)
CO2 SERPL-SCNC: 23 MMOL/L — SIGNIFICANT CHANGE UP (ref 22–31)
CREAT SERPL-MCNC: 0.7 MG/DL — SIGNIFICANT CHANGE UP (ref 0.5–1.3)
EGFR: 122 ML/MIN/1.73M2 — SIGNIFICANT CHANGE UP
GLUCOSE BLDC GLUCOMTR-MCNC: 150 MG/DL — HIGH (ref 70–99)
GLUCOSE BLDC GLUCOMTR-MCNC: 166 MG/DL — HIGH (ref 70–99)
GLUCOSE BLDC GLUCOMTR-MCNC: 230 MG/DL — HIGH (ref 70–99)
GLUCOSE SERPL-MCNC: 158 MG/DL — HIGH (ref 70–99)
HCT VFR BLD CALC: 48.2 % — SIGNIFICANT CHANGE UP (ref 39–50)
HGB BLD-MCNC: 16.3 G/DL — SIGNIFICANT CHANGE UP (ref 13–17)
INR BLD: 1.36 RATIO — HIGH (ref 0.88–1.16)
MAGNESIUM SERPL-MCNC: 2.4 MG/DL — SIGNIFICANT CHANGE UP (ref 1.6–2.6)
MCHC RBC-ENTMCNC: 29.4 PG — SIGNIFICANT CHANGE UP (ref 27–34)
MCHC RBC-ENTMCNC: 33.8 GM/DL — SIGNIFICANT CHANGE UP (ref 32–36)
MCV RBC AUTO: 86.8 FL — SIGNIFICANT CHANGE UP (ref 80–100)
NRBC # BLD: 0 /100 WBCS — SIGNIFICANT CHANGE UP (ref 0–0)
PHOSPHATE SERPL-MCNC: 3.1 MG/DL — SIGNIFICANT CHANGE UP (ref 2.5–4.5)
PLATELET # BLD AUTO: 189 K/UL — SIGNIFICANT CHANGE UP (ref 150–400)
POTASSIUM SERPL-MCNC: 3.8 MMOL/L — SIGNIFICANT CHANGE UP (ref 3.5–5.3)
POTASSIUM SERPL-SCNC: 3.8 MMOL/L — SIGNIFICANT CHANGE UP (ref 3.5–5.3)
PROTHROM AB SERPL-ACNC: 16.2 SEC — HIGH (ref 10.5–13.4)
RBC # BLD: 5.55 M/UL — SIGNIFICANT CHANGE UP (ref 4.2–5.8)
RBC # FLD: 13.2 % — SIGNIFICANT CHANGE UP (ref 10.3–14.5)
SODIUM SERPL-SCNC: 138 MMOL/L — SIGNIFICANT CHANGE UP (ref 135–145)
WBC # BLD: 7.23 K/UL — SIGNIFICANT CHANGE UP (ref 3.8–10.5)
WBC # FLD AUTO: 7.23 K/UL — SIGNIFICANT CHANGE UP (ref 3.8–10.5)

## 2022-07-21 PROCEDURE — 99239 HOSP IP/OBS DSCHRG MGMT >30: CPT | Mod: GC

## 2022-07-21 RX ORDER — WARFARIN SODIUM 2.5 MG/1
1 TABLET ORAL
Qty: 15 | Refills: 0
Start: 2022-07-21 | End: 2022-08-04

## 2022-07-21 RX ORDER — CARVEDILOL PHOSPHATE 80 MG/1
1 CAPSULE, EXTENDED RELEASE ORAL
Qty: 30 | Refills: 0
Start: 2022-07-21 | End: 2022-08-04

## 2022-07-21 RX ORDER — LISINOPRIL 2.5 MG/1
1 TABLET ORAL
Qty: 15 | Refills: 0
Start: 2022-07-21 | End: 2022-08-04

## 2022-07-21 RX ORDER — DILTIAZEM HCL 120 MG
1 CAPSULE, EXT RELEASE 24 HR ORAL
Qty: 15 | Refills: 0
Start: 2022-07-21 | End: 2022-08-04

## 2022-07-21 RX ORDER — METFORMIN HYDROCHLORIDE 850 MG/1
1 TABLET ORAL
Qty: 30 | Refills: 0
Start: 2022-07-21 | End: 2022-08-04

## 2022-07-21 RX ORDER — ATORVASTATIN CALCIUM 80 MG/1
1 TABLET, FILM COATED ORAL
Qty: 15 | Refills: 0
Start: 2022-07-21 | End: 2022-08-04

## 2022-07-21 RX ADMIN — Medication 1: at 17:19

## 2022-07-21 RX ADMIN — Medication 240 MILLIGRAM(S): at 06:51

## 2022-07-21 RX ADMIN — Medication 2: at 12:12

## 2022-07-21 RX ADMIN — ENOXAPARIN SODIUM 65 MILLIGRAM(S): 100 INJECTION SUBCUTANEOUS at 17:20

## 2022-07-21 RX ADMIN — ENOXAPARIN SODIUM 65 MILLIGRAM(S): 100 INJECTION SUBCUTANEOUS at 06:52

## 2022-07-21 RX ADMIN — WARFARIN SODIUM 10 MILLIGRAM(S): 2.5 TABLET ORAL at 00:03

## 2022-07-21 NOTE — PROGRESS NOTE ADULT - REASON FOR ADMISSION
SOB, Chest Pain

## 2022-07-21 NOTE — PROGRESS NOTE ADULT - PROBLEM SELECTOR PLAN 1
-h/o Afib w/ RVR requiring three prior hospitalizations w/ cardioversions, last hospitalization 2021; non-compliant with medications (previously on coumadin and metoprolol) and loss to f/u >1yr  -EKG on admission shows Afib w/ RVR   s/p Cardizem IVP > metoprolol 5 IVP > Cardizem 20 IVP not rate controlled, and then started on Cardizem ggt in ED  -CHADSVASC: 2 points with 4.6% risk of stroke, TIA, ans systemic embolism   -Admit to tele- Goal rate <110  -d/c cardizem ggt; switch to oral Diltiazem 240 mg ER for rate control  --s/p digoxin loading 500mcg IVPx1 in ED. D/evelyn digoxin on 7/17.  -will hold off on cardioversion as patient has been off AC and is at high risk for embolizing   -d/c FD lovenox, bridging to Warfarin 5 mg as pt does not have health insurance. Warfarin dosage increased from 5 mg to 7.5 mg due to subtherapeutic INR level (1.36)  -TTE: Normal Left Ventricular Systolic Function,  (EF = 55 to 60%)  -Nuclear stress test performed, wnl  -Cardio Consulted - Dr. Jaramillo: pt spontaneously converted overnight; no need for further inpatient workup, stable for D/C. Pt needs to avoid excessive alcohol use.  -Will be d/evelyn on Warfarin, will obtain INR check w/ PCP, Dr. Veras at [facility], appointment made  -due to lack of insurance, will need to f/u w/ VIVO meds on discharge.

## 2022-07-21 NOTE — PROGRESS NOTE ADULT - ASSESSMENT
Patient is 35 y/o male, Trinidadian-speaking with PMHx of DM (on metformin and glipizide), HTN (on lisinopril), and cardiac arrythmia (not currently on AC or rate control meds) and no significant PSHx presenting with acute palpitations with associated chest pain, SOB, and nausea x1d. Cardio Dr. Gonzales consulted by ED provider. Patient admitted to tele for Afib w/ RVR, started on Cardizem gtt and dig loaded. 
Patient is 35 y/o male, Zimbabwean-speaking with PMHx of DM (on metformin and glipizide), HTN (on lisinopril), and cardiac arrythmia (not currently on AC or rate control meds) and no significant PSHx presenting with acute palpitations with associated chest pain, SOB, and nausea x1d. Cardio Dr. Gonzales consulted by ED provider. Patient admitted to tele for Afib w/ RVR, started on Cardizem gtt and dig loaded.       PRIMARY TEAM TO CONFIRM MEDICATIONS, PHARMACY CLOSED AT THE TIME OF ENCOUNTER.   SW AND CM CONSULTED, PATIENT MURPHY NOT HAVE INSURANCE, WILL NEED VIVO MEDS UPON DC. 
Patient is 35 y/o male, [place]-speaking with PMHx of DM (on metformin and glipizide), HTN (on lisinopril), and cardiac arrythmia (not currently on AC or rate control meds) and no significant PSHx presenting with acute palpitations with associated chest pain, SOB, and nausea x1d. Cardio Dr. Gonzales consulted by ED provider. Patient admitted to tele for Afib w/ RVR, started on Cardizem gtt and dig loaded.       PRIMARY TEAM TO CONFIRM MEDICATIONS, PHARMACY CLOSED AT THE TIME OF ENCOUNTER.   SW AND CM CONSULTED, PATIENT MURPHY NOT HAVE INSURANCE, WILL NEED VIVO MEDS UPON DC. 
Patient is 37 y/o male, Cymraes-speaking with PMHx of DM (on metformin and glipizide), HTN (on lisinopril), and cardiac arrythmia (not currently on AC or rate control meds) and no significant PSHx presenting with acute palpitations with associated chest pain, SOB, and nausea x1d. Cardio Dr. Gonzales consulted by ED provider. Patient admitted to tele for Afib w/ RVR, started on Cardizem gtt and dig loaded. 
Patient is 35 y/o male, Qatari-speaking with PMHx of DM (on metformin and glipizide), HTN (on lisinopril), and cardiac arrythmia (not currently on AC or rate control meds) and no significant PSHx presenting with acute palpitations with associated chest pain, SOB, and nausea x1d. Cardio Dr. Gonzales consulted by ED provider. Patient admitted to tele for Afib w/ RVR, started on Cardizem gtt and dig loaded.

## 2022-07-21 NOTE — PHARMACOTHERAPY INTERVENTION NOTE - COMMENTS
VIVO medications were delivered to the patient's RN Shakir because the patient is in a Contact Precaution Room, along with the [employer] CareNotes in [employer] for the discharge medications.

## 2022-07-21 NOTE — PROGRESS NOTE ADULT - PROVIDER SPECIALTY LIST ADULT
Cardiology
Internal Medicine
Internal Medicine
Cardiology
Internal Medicine

## 2022-07-21 NOTE — PROGRESS NOTE ADULT - ATTENDING COMMENTS
Appointment has been scheduled for patient to f/u at [facility] for f/u INR next week.   Patient understands need for anticoagulation and follow up.
Patient is alert and cooperative.   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    Plan: continue warfarin          discharge in AM.  He will f/u at [facility] outpatient.  Has appointment on Friday.  Will need Vivo meds.
35yo M PMhx of A. Fib, HTN, Dm2 who presented with A. Fib with RVR. Patient reports having palpitations with SOB. In the ED remained tachycardic despite IV metoprolol and Diltiazem and started on cardizem drip and then digoxin loading. Patient reports previously requiring cardioversion for rate control. Per EP, patient tends to convert to A. Fib after binge alcohol episodes. Will  on limiting alcohol intake. Monitor heart rate on telemetry and transition to PO diltiazem when stable. Follow-up TTE. Start on lovenox BID, start on Coumadin loading as [id number] 2. If remains in A. Fib may require cardioversion. Will need to find provider in [place] for follow-up.
37yo M PMhx of A. Fib, HTN, Dm2 who presented with ABDULLAHI Bolaños with RVR. Heart rate difficult to control on metoprolol, diltiazem, then started on digoxin load.   Overnight patient converted to sinus rhythm. Transition diltiazem drip to PO diltiazem. Patient reports significant binge drinking prior to arrythmia, counseled patient on limiting alcohol intake.   Continue on lovenox bridging with coumadin. When INR uptrending, can be discharged to follow-up with outpatient provider. Patient has seen Dr. Porter previously, scheduled INR check on Friday. Will need VIVO meds on discharge.
35yo M PMhx of A. Fib, HTN, Dm2 who presented with ABDULLAHI Bolaños with RVR. Heart rate difficult to control on metoprolol, diltiazem, then started on digoxin load. Overnight patient converted to sinus rhythm. Transition diltiazem drip to PO diltiazem. Patient reports significant binge drinking prior to arrythmia, counseled patient on limiting alcohol intake. Continue on lovenox bridging with coumadin. When INR uptrending, can be discharged to follow-up with outpatient provider. Patient has seen Dr. Porter previously, scheduled INR check on Friday. Will need VIVO meds on discharge.

## 2022-07-21 NOTE — PROGRESS NOTE ADULT - SUBJECTIVE AND OBJECTIVE BOX
PGY-3 Progress Note discussed with attending    PAGER #: [phone]] TILL 5:00 PM  PLEASE CONTACT ON CALL TEAM:  - On Call Team (Please refer to Nanda) FROM 5:00 PM - 8:30PM  - Nightfloat Team FROM 8:30 -7:30 AM    INTERVAL HPI/OVERNIGHT EVENTS:   Pt was examined at bedside this morning. Pt denies chest pain, palpitations, or shortness of breath. Pt states that he was able to sleep comfortably. No acute events on telemetry overnight. Will discharge today on Vivo meds.    REVIEW OF SYSTEMS:  CONSTITUTIONAL: No fever, weight loss, or fatigue  RESPIRATORY: No cough, wheezing, chills or hemoptysis; No shortness of breath  CARDIOVASCULAR: No chest pain, palpitations, dizziness, or leg swelling  GASTROINTESTINAL: No abdominal pain. No nausea, vomiting, or hematemesis; No diarrhea or constipation. No melena or hematochezia.  GENITOURINARY: No dysuria or hematuria, urinary frequency  NEUROLOGICAL: No headaches, memory loss, loss of strength, numbness, or tremors  SKIN: No itching, burning, rashes, or lesions     Vital Signs Last 24 Hrs  T(C): 36.5 (21 Jul 2022 07:40), Max: 37 (20 Jul 2022 16:18)  T(F): 97.7 (21 Jul 2022 07:40), Max: 98.6 (20 Jul 2022 16:18)  HR: 70 (21 Jul 2022 07:40) (65 - 76)  BP: 111/73 (21 Jul 2022 07:40) (105/69 - 114/73)  BP(mean): --  RR: 17 (21 Jul 2022 07:40) (17 - 18)  SpO2: 98% (21 Jul 2022 07:40) (96% - 98%)    Parameters below as of 21 Jul 2022 07:40  Patient On (Oxygen Delivery Method): room air      PHYSICAL EXAMINATION:  GENERAL: NAD, well built  HEAD:  Atraumatic, Normocephalic  EYES:  conjunctiva and sclera clear  NECK: Supple, No JVD, Normal thyroid  CHEST/LUNG: Clear to auscultation. Clear to percussion bilaterally; No rales, rhonchi, wheezing, or rubs  HEART: Regular rate and rhythm; No murmurs, rubs, or gallops  ABDOMEN: Soft, Nontender, Nondistended; Bowel sounds present  NERVOUS SYSTEM:  Alert & Oriented X3,    EXTREMITIES:  2+ Peripheral Pulses, No clubbing, cyanosis, or edema  SKIN: warm dry                          16.3   7.23  )-----------( 189      ( 21 Jul 2022 06:45 )             48.2     07-21    138  |  105  |  15  ----------------------------<  158<H>  3.8   |  23  |  0.70    Ca    9.3      21 Jul 2022 06:45  Phos  3.1     07-21  Mg     2.4     07-21            PT/INR - ( 21 Jul 2022 06:45 )   PT: 16.2 sec;   INR: 1.36 ratio

## 2022-08-11 PROCEDURE — 80048 BASIC METABOLIC PNL TOTAL CA: CPT

## 2022-08-11 PROCEDURE — 82962 GLUCOSE BLOOD TEST: CPT

## 2022-08-11 PROCEDURE — 80053 COMPREHEN METABOLIC PANEL: CPT

## 2022-08-11 PROCEDURE — 71045 X-RAY EXAM CHEST 1 VIEW: CPT

## 2022-08-11 PROCEDURE — 93017 CV STRESS TEST TRACING ONLY: CPT

## 2022-08-11 PROCEDURE — 85027 COMPLETE CBC AUTOMATED: CPT

## 2022-08-11 PROCEDURE — 84100 ASSAY OF PHOSPHORUS: CPT

## 2022-08-11 PROCEDURE — 84436 ASSAY OF TOTAL THYROXINE: CPT

## 2022-08-11 PROCEDURE — 96374 THER/PROPH/DIAG INJ IV PUSH: CPT

## 2022-08-11 PROCEDURE — 84443 ASSAY THYROID STIM HORMONE: CPT

## 2022-08-11 PROCEDURE — 82009 KETONE BODYS QUAL: CPT

## 2022-08-11 PROCEDURE — 36415 COLL VENOUS BLD VENIPUNCTURE: CPT

## 2022-08-11 PROCEDURE — 99291 CRITICAL CARE FIRST HOUR: CPT

## 2022-08-11 PROCEDURE — A9502: CPT

## 2022-08-11 PROCEDURE — 78452 HT MUSCLE IMAGE SPECT MULT: CPT

## 2022-08-11 PROCEDURE — 81003 URINALYSIS AUTO W/O SCOPE: CPT

## 2022-08-11 PROCEDURE — 83036 HEMOGLOBIN GLYCOSYLATED A1C: CPT

## 2022-08-11 PROCEDURE — 84480 ASSAY TRIIODOTHYRONINE (T3): CPT

## 2022-08-11 PROCEDURE — 87635 SARS-COV-2 COVID-19 AMP PRB: CPT

## 2022-08-11 PROCEDURE — 85025 COMPLETE CBC W/AUTO DIFF WBC: CPT

## 2022-08-11 PROCEDURE — 93005 ELECTROCARDIOGRAM TRACING: CPT

## 2022-08-11 PROCEDURE — 85730 THROMBOPLASTIN TIME PARTIAL: CPT

## 2022-08-11 PROCEDURE — 83735 ASSAY OF MAGNESIUM: CPT

## 2022-08-11 PROCEDURE — 86703 HIV-1/HIV-2 1 RESULT ANTBDY: CPT

## 2022-08-11 PROCEDURE — 85610 PROTHROMBIN TIME: CPT

## 2022-08-11 PROCEDURE — 93306 TTE W/DOPPLER COMPLETE: CPT

## 2022-08-11 PROCEDURE — 84484 ASSAY OF TROPONIN QUANT: CPT

## 2022-10-08 NOTE — ED PROVIDER NOTE - NS ED MD TWO NIGHTS YN

## 2023-09-08 NOTE — ED ADULT NURSE NOTE - PRIMARY CARE PROVIDER
Chief Complaint   Patient presents with   • Extremity Weakness       History Of Present Illness  Francine is a 89 year old female, former smoker, with medical history significant for Paroxysmal Atrial Fibrillation on Xarelto, Essential Hypertension and Hyperlipidemia who presented to [facility] ED via EMS on 09/07/23 for evaluation of \"not feeling well\" that began after she woke up earlier that morning.  She later developed a headache and subsequently started feeling like her Right face, arm and leg were numb.  She did not have any speech impairment or visual dysfunction.  Patient had not had nausea, vomiting, chest pain or shortness of breath.      Work-up in the ED:  CT-Brain was negative for any acute intracranial abnormalities.  Lab testing showed Sodium, Potassium 4.1, Magnesium 2.2,  mg%, BUN 35, Cr. 1.09.  WBC 8.5, Hgb 12.1, PLT 336k.  Coag panel in normal range.  Troponin-I 17.   EKG:  Sinus rhythm with PVC, LVH.  No acute ischemic changes.      Code Neuro was called and Tele-Neuro did evaluate the patient.  NIHSS Total: 0    Neurology was consulted and patient was admitted for observation      Patient was seen by me in the ED (Rm 53).  She stated her headache had been around her forehead region and was severe for a period of time, but slowly resolved after a dose of Tylenol.  Patient stated she checked her BP at home and was 170/90 which frightened her as it is \"never that high\" at home.  She does note it tends to be much higher in the hospital or her doctors office.  Patient was feeling so anxious about it that she developed a headache.   She now feels her symptoms are resolving.            Past Medical History  Past Medical History:   Diagnosis Date   • Atrial fibrillation (CMD)    • Essential (primary) hypertension    • Macular degeneration    • Malignant neoplasm (CMD)         Surgical History  Past Surgical History:   Procedure Laterality Date   • Appendectomy     • Colon surgery     • Hernia repair      • Hysterectomy          Social History  Social History     Tobacco Use   • Smoking status: Former     Current packs/day: 0.00     Types: Cigarettes     Quit date: 1993     Years since quittin.6   • Smokeless tobacco: Never   Substance Use Topics   • Alcohol use: Yes     Alcohol/week: 7.0 standard drinks of alcohol     Types: 7 Glasses of wine per week     Comment: 1 drink before dinner   • Drug use: Never       Family History  History reviewed. No pertinent family history.     Allergies  ALLERGIES:  Patient has no known allergies.    Medications    Current Outpatient Medications on File Prior to Encounter   Medication Sig Dispense Refill   • rivaroxaban (Xarelto) 15 MG Tab Take 15 mg by mouth nightly.       • amLODIPine (Norvasc) 5 MG tablet Take 1 tablet by mouth daily. 30 tablet 0   • [DISCONTINUED] apixaBAN (ELIQUIS) 2.5 MG Tab Take 1 tablet by mouth every 12 hours. 60 tablet 0   • metoPROLOL succinate (TOPROL-XL) 50 MG 24 hr tablet Take 1 tablet by mouth daily. Do not start before 2020. 30 tablet 0   • hydrALAZINE (APRESOLINE) 50 MG tablet Take 1 tablet by mouth every 8 hours. HOLD if SBP < 130s 90 tablet 11   • acetaminophen (TYLENOL) 325 MG tablet Take 2 tablets by mouth every 4 hours as needed for Pain. 30 tablet 0   • aspirin 81 MG EC tablet Take 1 tablet by mouth daily. Do not start before 2020. 30 tablet 11   • losartan (COZAAR) 50 MG tablet Take 1 tablet by mouth every 12 hours. 60 tablet 11             Review of Systems  Review of Systems   Constitutional: Negative for activity change, chills, fatigue and fever.   HENT: Negative.    Eyes: Negative.    Respiratory: Negative.    Cardiovascular: Negative.    Gastrointestinal: Negative.    Endocrine: Negative.    Genitourinary: Negative.    Musculoskeletal: Negative.    Skin: Negative.    Allergic/Immunologic: Negative.    Neurological: Positive for numbness and headaches. Negative for dizziness, syncope and  light-headedness.   Hematological: Negative for adenopathy.   Psychiatric/Behavioral: Negative for confusion and hallucinations. The patient is nervous/anxious.          Last Recorded Vitals  Temp:  [98.1 °F (36.7 °C)-98.2 °F (36.8 °C)] 98.2 °F (36.8 °C)  Heart Rate:  [69-74] 70  Resp:  [16-19] 17  BP: (140-149)/(59-79) 140/62      Physical Exam  Constitutional:       General: She is not in acute distress.     Appearance: Normal appearance. She is not ill-appearing.   HENT:      Head: Atraumatic.      Mouth/Throat:      Mouth: Mucous membranes are moist.      Pharynx: Oropharynx is clear.      Neck: Normal range of motion and neck supple.   Eyes:      Extraocular Movements: Extraocular movements intact.      Conjunctiva/sclera: Conjunctivae normal.      Pupils: Pupils are equal, round, and reactive to light.   Cardiovascular:      Rate and Rhythm: Normal rate and regular rhythm.      Pulses: Normal pulses.      Heart sounds: Normal heart sounds.   Pulmonary:      Effort: Pulmonary effort is normal.      Breath sounds: Normal breath sounds.   Abdominal:      General: Abdomen is flat. Bowel sounds are normal.      Palpations: Abdomen is soft.   Genitourinary:     General: Normal vulva.      Rectum: Normal.   Musculoskeletal:         General: Normal range of motion.   Skin:     General: Skin is warm and dry.   Neurological:      General: No focal deficit present.      Mental Status: She is alert and oriented to person, place, and time.   Psychiatric:         Mood and Affect: Mood normal.            Labs   Recent Results (from the past 24 hour(s))   GLUCOSE, BEDSIDE - POINT OF CARE    Collection Time: 09/07/23  6:59 PM   Result Value Ref Range    GLUCOSE, BEDSIDE - POINT OF CARE 126 (H) 70 - 99 mg/dL   Prothrombin Time (INR/PT)    Collection Time: 09/07/23  7:43 PM   Result Value Ref Range    Protime- PT 10.8 9.7 - 11.8 sec    INR 1.1     Partial Thromboplastin Time (PTT)    Collection Time: 09/07/23  7:43 PM   Result  Value Ref Range    PTT 23 22 - 30 sec   Comprehensive Metabolic Panel    Collection Time: 09/07/23  7:57 PM   Result Value Ref Range    Fasting Status      Sodium 134 (L) 135 - 145 mmol/L    Potassium 4.1 3.4 - 5.1 mmol/L    Chloride 106 97 - 110 mmol/L    Carbon Dioxide 21 21 - 32 mmol/L    Anion Gap 11 7 - 19 mmol/L    Glucose 127 (H) 70 - 99 mg/dL    BUN 35 (H) 6 - 20 mg/dL    Creatinine 1.09 (H) 0.51 - 0.95 mg/dL    Glomerular Filtration Rate 48 (L) >=60    BUN/Cr 32 (H) 7 - 25    Calcium 9.1 8.4 - 10.2 mg/dL    Bilirubin, Total 0.5 0.2 - 1.0 mg/dL    GOT/AST 20 <=37 Units/L    GPT/ALT 35 <64 Units/L    Alkaline Phosphatase 44 (L) 45 - 117 Units/L    Albumin 3.8 3.6 - 5.1 g/dL    Protein, Total 7.0 6.4 - 8.2 g/dL    Globulin 3.2 2.0 - 4.0 g/dL    A/G Ratio 1.2 1.0 - 2.4   Magnesium    Collection Time: 09/07/23  7:57 PM   Result Value Ref Range    Magnesium 2.2 1.7 - 2.4 mg/dL   TROPONIN I, HIGH SENSITIVITY    Collection Time: 09/07/23  7:57 PM   Result Value Ref Range    Troponin I, High Sensitivity 17 <52 ng/L   CBC with Automated Differential (performable only)    Collection Time: 09/07/23  7:57 PM   Result Value Ref Range    WBC 8.5 4.2 - 11.0 K/mcL    RBC 3.22 (L) 4.00 - 5.20 mil/mcL    HGB 12.1 12.0 - 15.5 g/dL    HCT 35.6 (L) 36.0 - 46.5 %    .6 (H) 78.0 - 100.0 fl    MCH 37.6 (H) 26.0 - 34.0 pg    MCHC 34.0 32.0 - 36.5 g/dL    RDW-CV 13.2 11.0 - 15.0 %    RDW-SD 54.6 (H) 39.0 - 50.0 fL     140 - 450 K/mcL    NRBC 0 <=0 /100 WBC    Neutrophil, Percent 72 %    Lymphocytes, Percent 14 %    Mono, Percent 10 %    Eosinophils, Percent 2 %    Basophils, Percent 1 %    Immature Granulocytes 1 %    Absolute Neutrophils 6.2 1.8 - 7.7 K/mcL    Absolute Lymphocytes 1.2 1.0 - 4.0 K/mcL    Absolute Monocytes 0.9 0.3 - 0.9 K/mcL    Absolute Eosinophils  0.2 0.0 - 0.5 K/mcL    Absolute Basophils 0.1 0.0 - 0.3 K/mcL    Absolute Immature Granulocytes 0.1 0.0 - 0.2 K/mcL   Electrocardiogram 12-Lead     Collection Time: 09/07/23  8:05 PM   Result Value Ref Range    Ventricular Rate EKG/Min (BPM) 71     Atrial Rate (BPM) 71     OR-Interval (MSEC) 198     QRS-Interval (MSEC) 108     QT-Interval (MSEC) 404     QTc 439     P Axis (Degrees) 81     R Axis (Degrees) -31     T Axis (Degrees) 58     REPORT TEXT       Sinus rhythm  with  premature atrial complexes  with  aberrant conduction  Left axis deviation  Left ventricular hypertrophy  with repolarization abnormality  Baseline artifact, overall fair for interpretation  Abnormal ECG  When compared with ECG of  23-OCT-2020 14:28,  No significant change was found  Confirmed by KELBY HAAS DO ([id number]) on 9/7/2023 11:38:26 PM     Urinalysis & Reflex Microscopy With Culture If Indicated    Collection Time: 09/07/23  8:59 PM   Result Value Ref Range    COLOR, URINALYSIS Colorless     APPEARANCE, URINALYSIS Clear     GLUCOSE, URINALYSIS Negative Negative mg/dL    BILIRUBIN, URINALYSIS Negative Negative    KETONES, URINALYSIS Trace (A) Negative mg/dL    SPECIFIC GRAVITY, URINALYSIS 1.009 1.005 - 1.030    OCCULT BLOOD, URINALYSIS Negative Negative    PH, URINALYSIS 5.5 5.0 - 7.0    PROTEIN, URINALYSIS Negative Negative mg/dL    UROBILINOGEN, URINALYSIS 0.2 0.2, 1.0 mg/dL    NITRITE, URINALYSIS Negative Negative    LEUKOCYTE ESTERASE, URINALYSIS Moderate (A) Negative    SQUAMOUS EPITHELIAL, URINALYSIS 1 to 5 None Seen, 1 to 5 /hpf    ERYTHROCYTES, URINALYSIS 1 to 2 None Seen, 1 to 2 /hpf    LEUKOCYTES, URINALYSIS 6 to 10 (A) None Seen, 1 to 5 /hpf    BACTERIA, URINALYSIS None Seen None Seen /hpf    HYALINE CASTS, URINALYSIS None Seen None Seen, 1 to 5 /lpf       Imaging    CT HEAD LEVEL 1    Result Date: 9/7/2023  EXAM: CT HEAD LEVEL 1 CLINICAL INDICATION: Right arm and leg numbness. Right-sided headache. COMPARISON: 6/11/2011 TECHNIQUE: Axial images base to vertex without IV contrast, standard protocol.  One or more of the following dose reduction technique used: Automated  exposure control for dose reduction, adjustment of the mA and/or kV based on the patient's size or use of iterative reconstruction technique dose reduction. FINDINGS: Streak artifact limits detail of some of the inferior images. Prominent age-appropriate cerebral volume loss noted. No acute intracranial hemorrhage, mass effect, or concerning midline shift noted. Gray-white differentiation appears maintained. If more detailed, more sensitive, imaging is desired of the brain parenchyma, MRI can be obtained (assuming no contraindication). Stable cranium. Sinuses and mastoids are aerated.     1.  No acute intracranial findings visualized by CT. findings discussed by phone with Dr. Hartman on 9/7/2023 at 9:18 p.m. **The absence of findings should not deter follow-up or further evaluation of concerning clinical findings. Please note that each modality type (e.g. x-ray, ultrasound, CT, MRI, nuclear medicine) has specific optimal value for assessing specific clinical questions.  In addition, each modality type has limitations.  If you have any questions, feel free to contact me. FOR PHYSICIAN USE ONLY: Please note that this report was generated using voice recognition software and may contain errors.  If you require clarification or feel that there is an error in this report, please contact me. Electronically Signed by: ACACIA MADISON M.D. Signed on: 9/7/2023 7:19 PM Workstation ID: [id number]            Assessment and plan  Principal Problem:    Paresthesia    Transient Sensory Impairment  - Associated with paresthesias  - Also developed headache  - BP was elevated at time of event  - Symptoms resolved upon arrival to the ED  - Was seen by Tele-Neuro   - NIHSS was determined as zero  - CT-brain was negative for acute intracranial process  - Neurology was consulted, input appreciated     Acute Urinary Tract Infection   - Urinalysis with +nitrites, pyuria   - Urine culture obtained   - Was started on Rocephin   - Will  transition to Ceftin upon discharge     Paroxysmal Atrial Fibrillation   - Rate is well controlon Xarelto  - Continue beta blocker  - Continue Xarelto to minimize stroke risk    Chronic Congestive Heart Failure   - Echocardiogram   - Continue on Digoxin   - Continue Lasix   - Also takes Entresto  - Condition has been stable     Essential Hypertension   - BP has been elevated   - Was up to 170/90 at home  - SBP Improved to 145-155 mmHg  - Continue Norvasc 5 mg daily   - Continue Metoprolol  - Continue Lasix   - Also on Entresto  - Monitor vitals on telemetry     Hyperlipidemia   - Continue Pravastatin     DVT Prophylaxis  - SCD  - Xarelto       Advance Care Planning: POLST  A voluntary discussion was obtained face to face with patient and/or POA regarding wishes regarding defining, clarifying and documenting patients decision.   Among the point of discussion were choices of FULL DNR,  FULL CODE, or Limited to which choices include yes or no answers to 1) Intubation and mechanical ventilaton,  2) electrical cardioversion, 3) IV pressors for hypotension and 4) IV antiarrthmics for malignant arrhythmia's.   NG and Gastric feeding tubes were also discussion in addition to identification of POA if not already done in addition to the POLST form.  Total time of bedside discussion was in excess of 20 minutes, and based the discussion an order was placed.    Code Status  Do Not Resuscitate      Primary Care Physician  David Jones MD S. Charles Lindsey, MD  Hospitalist  [facility]  24 Hr Hospitalist Service with Same Physician Continuity of Care  [phone] 24hr Answering Service         unknown

## 2023-09-15 NOTE — H&P ADULT - PROBLEM/PLAN-1
Picato Counseling:  I discussed with the patient the risks of Picato including but not limited to erythema, scaling, itching, weeping, crusting, and pain. DISPLAY PLAN FREE TEXT

## 2024-02-08 ENCOUNTER — INPATIENT (INPATIENT)
Facility: HOSPITAL | Age: 39
LOS: 1 days | Discharge: HOME | DRG: 310 | End: 2024-02-10
Attending: INTERNAL MEDICINE | Admitting: INTERNAL MEDICINE
Payer: MEDICAID

## 2024-02-08 VITALS
RESPIRATION RATE: 18 BRPM | TEMPERATURE: 99 F | DIASTOLIC BLOOD PRESSURE: 87 MMHG | HEART RATE: 87 BPM | OXYGEN SATURATION: 99 % | SYSTOLIC BLOOD PRESSURE: 145 MMHG

## 2024-02-08 PROCEDURE — 99291 CRITICAL CARE FIRST HOUR: CPT

## 2024-02-08 NOTE — ED ADULT TRIAGE NOTE - CHIEF COMPLAINT QUOTE
walk in pt with c/o midsternal non radiating chest pain x 20 min. Pt reports he was experiencing something like heartburn and had one episode of vomiting. Now states chest pain and intermittent palpitations. HX dm and 'arrythmia.'

## 2024-02-09 ENCOUNTER — RESULT REVIEW (OUTPATIENT)
Age: 39
End: 2024-02-09

## 2024-02-09 ENCOUNTER — TRANSCRIPTION ENCOUNTER (OUTPATIENT)
Age: 39
End: 2024-02-09

## 2024-02-09 DIAGNOSIS — F10.10 ALCOHOL ABUSE, UNCOMPLICATED: ICD-10-CM

## 2024-02-09 DIAGNOSIS — I48.0 PAROXYSMAL ATRIAL FIBRILLATION: ICD-10-CM

## 2024-02-09 DIAGNOSIS — I10 ESSENTIAL (PRIMARY) HYPERTENSION: ICD-10-CM

## 2024-02-09 DIAGNOSIS — E11.9 TYPE 2 DIABETES MELLITUS WITHOUT COMPLICATIONS: ICD-10-CM

## 2024-02-09 PROBLEM — I48.91 UNSPECIFIED ATRIAL FIBRILLATION: Chronic | Status: ACTIVE | Noted: 2020-12-30

## 2024-02-09 LAB
ALBUMIN SERPL ELPH-MCNC: 3.7 G/DL — SIGNIFICANT CHANGE UP (ref 3.4–5)
ALP SERPL-CCNC: 148 U/L — HIGH (ref 40–120)
ALT FLD-CCNC: 45 U/L — HIGH (ref 12–42)
ANION GAP SERPL CALC-SCNC: 16 MMOL/L — SIGNIFICANT CHANGE UP (ref 9–16)
APTT BLD: 30.7 SEC — SIGNIFICANT CHANGE UP (ref 24.5–35.6)
AST SERPL-CCNC: 33 U/L — SIGNIFICANT CHANGE UP (ref 15–37)
BASOPHILS # BLD AUTO: 0.06 K/UL — SIGNIFICANT CHANGE UP (ref 0–0.2)
BASOPHILS NFR BLD AUTO: 1 % — SIGNIFICANT CHANGE UP (ref 0–2)
BILIRUB SERPL-MCNC: 0.4 MG/DL — SIGNIFICANT CHANGE UP (ref 0.2–1.2)
BUN SERPL-MCNC: 9 MG/DL — SIGNIFICANT CHANGE UP (ref 7–23)
CALCIUM SERPL-MCNC: 10.6 MG/DL — HIGH (ref 8.5–10.5)
CHLORIDE SERPL-SCNC: 99 MMOL/L — SIGNIFICANT CHANGE UP (ref 96–108)
CO2 SERPL-SCNC: 24 MMOL/L — SIGNIFICANT CHANGE UP (ref 22–31)
CREAT SERPL-MCNC: 0.82 MG/DL — SIGNIFICANT CHANGE UP (ref 0.5–1.3)
EGFR: 115 ML/MIN/1.73M2 — SIGNIFICANT CHANGE UP
EOSINOPHIL # BLD AUTO: 0.04 K/UL — SIGNIFICANT CHANGE UP (ref 0–0.5)
EOSINOPHIL NFR BLD AUTO: 0.6 % — SIGNIFICANT CHANGE UP (ref 0–6)
FLUAV AG NPH QL: SIGNIFICANT CHANGE UP
FLUBV AG NPH QL: SIGNIFICANT CHANGE UP
GLUCOSE BLDC GLUCOMTR-MCNC: 220 MG/DL — HIGH (ref 70–99)
GLUCOSE BLDC GLUCOMTR-MCNC: 345 MG/DL — HIGH (ref 70–99)
GLUCOSE SERPL-MCNC: 369 MG/DL — HIGH (ref 70–99)
HCT VFR BLD CALC: 50.2 % — HIGH (ref 39–50)
HGB BLD-MCNC: 16.9 G/DL — SIGNIFICANT CHANGE UP (ref 13–17)
HIV 1 & 2 AB SERPL IA.RAPID: SIGNIFICANT CHANGE UP
IMM GRANULOCYTES NFR BLD AUTO: 0.3 % — SIGNIFICANT CHANGE UP (ref 0–0.9)
INR BLD: 0.88 — SIGNIFICANT CHANGE UP (ref 0.85–1.18)
LIDOCAIN IGE QN: 56 U/L — SIGNIFICANT CHANGE UP (ref 16–77)
LYMPHOCYTES # BLD AUTO: 1.62 K/UL — SIGNIFICANT CHANGE UP (ref 1–3.3)
LYMPHOCYTES # BLD AUTO: 26 % — SIGNIFICANT CHANGE UP (ref 13–44)
MAGNESIUM SERPL-MCNC: 2 MG/DL — SIGNIFICANT CHANGE UP (ref 1.6–2.6)
MCHC RBC-ENTMCNC: 30.2 PG — SIGNIFICANT CHANGE UP (ref 27–34)
MCHC RBC-ENTMCNC: 33.7 GM/DL — SIGNIFICANT CHANGE UP (ref 32–36)
MCV RBC AUTO: 89.8 FL — SIGNIFICANT CHANGE UP (ref 80–100)
MONOCYTES # BLD AUTO: 0.58 K/UL — SIGNIFICANT CHANGE UP (ref 0–0.9)
MONOCYTES NFR BLD AUTO: 9.3 % — SIGNIFICANT CHANGE UP (ref 2–14)
NEUTROPHILS # BLD AUTO: 3.9 K/UL — SIGNIFICANT CHANGE UP (ref 1.8–7.4)
NEUTROPHILS NFR BLD AUTO: 62.8 % — SIGNIFICANT CHANGE UP (ref 43–77)
NRBC # BLD: 0 /100 WBCS — SIGNIFICANT CHANGE UP (ref 0–0)
NT-PROBNP SERPL-SCNC: 25 PG/ML — SIGNIFICANT CHANGE UP
PLATELET # BLD AUTO: 161 K/UL — SIGNIFICANT CHANGE UP (ref 150–400)
POTASSIUM SERPL-MCNC: 3.5 MMOL/L — SIGNIFICANT CHANGE UP (ref 3.5–5.3)
POTASSIUM SERPL-SCNC: 3.5 MMOL/L — SIGNIFICANT CHANGE UP (ref 3.5–5.3)
PROT SERPL-MCNC: 7.9 G/DL — SIGNIFICANT CHANGE UP (ref 6.4–8.2)
PROTHROM AB SERPL-ACNC: 10 SEC — SIGNIFICANT CHANGE UP (ref 9.5–13)
RBC # BLD: 5.59 M/UL — SIGNIFICANT CHANGE UP (ref 4.2–5.8)
RBC # FLD: 12.6 % — SIGNIFICANT CHANGE UP (ref 10.3–14.5)
RSV RNA NPH QL NAA+NON-PROBE: SIGNIFICANT CHANGE UP
SARS-COV-2 RNA SPEC QL NAA+PROBE: SIGNIFICANT CHANGE UP
SODIUM SERPL-SCNC: 139 MMOL/L — SIGNIFICANT CHANGE UP (ref 132–145)
TROPONIN I, HIGH SENSITIVITY RESULT: 21.3 NG/L — SIGNIFICANT CHANGE UP
TROPONIN I, HIGH SENSITIVITY RESULT: 24 NG/L — SIGNIFICANT CHANGE UP
TSH SERPL-MCNC: 1.64 UIU/ML — SIGNIFICANT CHANGE UP (ref 0.36–3.74)
WBC # BLD: 6.22 K/UL — SIGNIFICANT CHANGE UP (ref 3.8–10.5)
WBC # FLD AUTO: 6.22 K/UL — SIGNIFICANT CHANGE UP (ref 3.8–10.5)

## 2024-02-09 PROCEDURE — 92960 CARDIOVERSION ELECTRIC EXT: CPT

## 2024-02-09 PROCEDURE — 93306 TTE W/DOPPLER COMPLETE: CPT | Mod: 26

## 2024-02-09 PROCEDURE — 99223 1ST HOSP IP/OBS HIGH 75: CPT

## 2024-02-09 PROCEDURE — 71045 X-RAY EXAM CHEST 1 VIEW: CPT | Mod: 26

## 2024-02-09 PROCEDURE — 99222 1ST HOSP IP/OBS MODERATE 55: CPT | Mod: 25

## 2024-02-09 PROCEDURE — 93312 ECHO TRANSESOPHAGEAL: CPT | Mod: 26

## 2024-02-09 RX ORDER — DILTIAZEM HCL 120 MG
20 CAPSULE, EXT RELEASE 24 HR ORAL ONCE
Refills: 0 | Status: COMPLETED | OUTPATIENT
Start: 2024-02-09 | End: 2024-02-09

## 2024-02-09 RX ORDER — DEXTROSE 50 % IN WATER 50 %
25 SYRINGE (ML) INTRAVENOUS ONCE
Refills: 0 | Status: DISCONTINUED | OUTPATIENT
Start: 2024-02-09 | End: 2024-02-10

## 2024-02-09 RX ORDER — INSULIN LISPRO 100/ML
VIAL (ML) SUBCUTANEOUS
Refills: 0 | Status: DISCONTINUED | OUTPATIENT
Start: 2024-02-09 | End: 2024-02-10

## 2024-02-09 RX ORDER — METOPROLOL TARTRATE 50 MG
50 TABLET ORAL EVERY 6 HOURS
Refills: 0 | Status: DISCONTINUED | OUTPATIENT
Start: 2024-02-09 | End: 2024-02-09

## 2024-02-09 RX ORDER — METFORMIN HYDROCHLORIDE 850 MG/1
1 TABLET ORAL
Qty: 0 | Refills: 0 | DISCHARGE

## 2024-02-09 RX ORDER — METOPROLOL TARTRATE 50 MG
50 TABLET ORAL ONCE
Refills: 0 | Status: COMPLETED | OUTPATIENT
Start: 2024-02-09 | End: 2024-02-09

## 2024-02-09 RX ORDER — GLUCAGON INJECTION, SOLUTION 0.5 MG/.1ML
1 INJECTION, SOLUTION SUBCUTANEOUS ONCE
Refills: 0 | Status: DISCONTINUED | OUTPATIENT
Start: 2024-02-09 | End: 2024-02-10

## 2024-02-09 RX ORDER — SODIUM CHLORIDE 9 MG/ML
2000 INJECTION INTRAMUSCULAR; INTRAVENOUS; SUBCUTANEOUS ONCE
Refills: 0 | Status: COMPLETED | OUTPATIENT
Start: 2024-02-09 | End: 2024-02-09

## 2024-02-09 RX ORDER — DIGOXIN 250 MCG
500 TABLET ORAL ONCE
Refills: 0 | Status: COMPLETED | OUTPATIENT
Start: 2024-02-09 | End: 2024-02-09

## 2024-02-09 RX ORDER — DILTIAZEM HCL 120 MG
30 CAPSULE, EXT RELEASE 24 HR ORAL ONCE
Refills: 0 | Status: COMPLETED | OUTPATIENT
Start: 2024-02-09 | End: 2024-02-09

## 2024-02-09 RX ORDER — METOPROLOL TARTRATE 50 MG
2.5 TABLET ORAL ONCE
Refills: 0 | Status: COMPLETED | OUTPATIENT
Start: 2024-02-09 | End: 2024-02-09

## 2024-02-09 RX ORDER — SODIUM CHLORIDE 9 MG/ML
1000 INJECTION, SOLUTION INTRAVENOUS
Refills: 0 | Status: DISCONTINUED | OUTPATIENT
Start: 2024-02-09 | End: 2024-02-10

## 2024-02-09 RX ORDER — LISINOPRIL 2.5 MG/1
1 TABLET ORAL
Qty: 0 | Refills: 0 | DISCHARGE

## 2024-02-09 RX ORDER — DILTIAZEM HCL 120 MG
5 CAPSULE, EXT RELEASE 24 HR ORAL
Qty: 125 | Refills: 0 | Status: DISCONTINUED | OUTPATIENT
Start: 2024-02-09 | End: 2024-02-09

## 2024-02-09 RX ORDER — WARFARIN SODIUM 2.5 MG/1
5 TABLET ORAL ONCE
Refills: 0 | Status: COMPLETED | OUTPATIENT
Start: 2024-02-09 | End: 2024-02-09

## 2024-02-09 RX ORDER — APIXABAN 2.5 MG/1
5 TABLET, FILM COATED ORAL ONCE
Refills: 0 | Status: COMPLETED | OUTPATIENT
Start: 2024-02-09 | End: 2024-02-09

## 2024-02-09 RX ORDER — DILTIAZEM HCL 120 MG
25 CAPSULE, EXT RELEASE 24 HR ORAL ONCE
Refills: 0 | Status: COMPLETED | OUTPATIENT
Start: 2024-02-09 | End: 2024-02-09

## 2024-02-09 RX ORDER — DEXTROSE 50 % IN WATER 50 %
15 SYRINGE (ML) INTRAVENOUS ONCE
Refills: 0 | Status: DISCONTINUED | OUTPATIENT
Start: 2024-02-09 | End: 2024-02-10

## 2024-02-09 RX ORDER — DILTIAZEM HCL 120 MG
20 CAPSULE, EXT RELEASE 24 HR ORAL ONCE
Refills: 0 | Status: DISCONTINUED | OUTPATIENT
Start: 2024-02-09 | End: 2024-02-09

## 2024-02-09 RX ORDER — METOPROLOL TARTRATE 50 MG
5 TABLET ORAL ONCE
Refills: 0 | Status: COMPLETED | OUTPATIENT
Start: 2024-02-09 | End: 2024-02-09

## 2024-02-09 RX ORDER — ESMOLOL HCL 100MG/10ML
50 VIAL (ML) INTRAVENOUS
Qty: 2500 | Refills: 0 | Status: DISCONTINUED | OUTPATIENT
Start: 2024-02-09 | End: 2024-02-09

## 2024-02-09 RX ORDER — POTASSIUM CHLORIDE 20 MEQ
40 PACKET (EA) ORAL EVERY 4 HOURS
Refills: 0 | Status: COMPLETED | OUTPATIENT
Start: 2024-02-09 | End: 2024-02-09

## 2024-02-09 RX ORDER — METOPROLOL TARTRATE 50 MG
50 TABLET ORAL DAILY
Refills: 0 | Status: DISCONTINUED | OUTPATIENT
Start: 2024-02-10 | End: 2024-02-10

## 2024-02-09 RX ORDER — FAMOTIDINE 10 MG/ML
20 INJECTION INTRAVENOUS ONCE
Refills: 0 | Status: COMPLETED | OUTPATIENT
Start: 2024-02-09 | End: 2024-02-09

## 2024-02-09 RX ORDER — DEXTROSE 50 % IN WATER 50 %
12.5 SYRINGE (ML) INTRAVENOUS ONCE
Refills: 0 | Status: DISCONTINUED | OUTPATIENT
Start: 2024-02-09 | End: 2024-02-10

## 2024-02-09 RX ORDER — ONDANSETRON 8 MG/1
4 TABLET, FILM COATED ORAL ONCE
Refills: 0 | Status: COMPLETED | OUTPATIENT
Start: 2024-02-09 | End: 2024-02-09

## 2024-02-09 RX ORDER — APIXABAN 2.5 MG/1
1 TABLET, FILM COATED ORAL
Qty: 60 | Refills: 0
Start: 2024-02-09 | End: 2024-03-09

## 2024-02-09 RX ORDER — APIXABAN 2.5 MG/1
5 TABLET, FILM COATED ORAL EVERY 12 HOURS
Refills: 0 | Status: DISCONTINUED | OUTPATIENT
Start: 2024-02-09 | End: 2024-02-09

## 2024-02-09 RX ADMIN — Medication 8: at 22:12

## 2024-02-09 RX ADMIN — Medication 2.5 MILLIGRAM(S): at 02:28

## 2024-02-09 RX ADMIN — ONDANSETRON 4 MILLIGRAM(S): 8 TABLET, FILM COATED ORAL at 00:35

## 2024-02-09 RX ADMIN — Medication 30 MILLIGRAM(S): at 01:52

## 2024-02-09 RX ADMIN — Medication 50 MICROGRAM(S)/KG/MIN: at 04:20

## 2024-02-09 RX ADMIN — Medication 30 MILLIGRAM(S): at 04:35

## 2024-02-09 RX ADMIN — Medication 5 MILLIGRAM(S): at 02:58

## 2024-02-09 RX ADMIN — Medication 50 MILLIGRAM(S): at 04:35

## 2024-02-09 RX ADMIN — Medication 40 MILLIEQUIVALENT(S): at 13:03

## 2024-02-09 RX ADMIN — Medication 5 MG/HR: at 12:16

## 2024-02-09 RX ADMIN — Medication 30 MILLILITER(S): at 00:35

## 2024-02-09 RX ADMIN — Medication 500 MICROGRAM(S): at 04:36

## 2024-02-09 RX ADMIN — Medication 22.5 MICROGRAM(S)/KG/MIN: at 03:11

## 2024-02-09 RX ADMIN — Medication 50 MILLIGRAM(S): at 18:12

## 2024-02-09 RX ADMIN — APIXABAN 5 MILLIGRAM(S): 2.5 TABLET, FILM COATED ORAL at 02:36

## 2024-02-09 RX ADMIN — Medication 20 MILLIGRAM(S): at 00:54

## 2024-02-09 RX ADMIN — Medication 4: at 18:24

## 2024-02-09 RX ADMIN — SODIUM CHLORIDE 2000 MILLILITER(S): 9 INJECTION INTRAMUSCULAR; INTRAVENOUS; SUBCUTANEOUS at 00:53

## 2024-02-09 RX ADMIN — Medication 50 MILLIGRAM(S): at 09:36

## 2024-02-09 RX ADMIN — Medication 5 MILLIGRAM(S): at 02:46

## 2024-02-09 RX ADMIN — Medication 25 MILLIGRAM(S): at 01:25

## 2024-02-09 RX ADMIN — APIXABAN 5 MILLIGRAM(S): 2.5 TABLET, FILM COATED ORAL at 13:04

## 2024-02-09 RX ADMIN — WARFARIN SODIUM 5 MILLIGRAM(S): 2.5 TABLET ORAL at 23:46

## 2024-02-09 RX ADMIN — FAMOTIDINE 20 MILLIGRAM(S): 10 INJECTION INTRAVENOUS at 00:35

## 2024-02-09 RX ADMIN — Medication 40 MILLIEQUIVALENT(S): at 09:17

## 2024-02-09 NOTE — DISCHARGE NOTE PROVIDER - HOSPITAL COURSE
39 y/o Frisian Speaking M with PMHx of paroxysmal afib (not compliant with medications), HTN, DM2 intermittently noncompliant w/ metformin), alcohol abuse who presents to [facility] ED complaining of palpitations and lightheadedness that started around 11 pm last night. Pt states symptoms were similar to prior afib episodes. Pt reports drinking about 8 beers yesterday and on average 6 beers daily; he denies smoking cigarettes or drug use. Upon arrival to ED, pt found to be in atrial fibrillation RVR with rates up to 160 bpm. At [facility], pt given Digoxin 500 mcg IV, diltiazem 30 mg po x 2, diltiazem 20 IV x 1, diltiazem 25 IV x 1, esmolol bolus 500 mcg IV, lopressor 50 mg po x 1, lopressor 2.5 IV x 1, lopressor 5 IV x 2 for acute rate control with still minimal improvement.     Upon arrival to [facility], pt in afib rvr with variable heart rate in 110-150 bpm range. He was started on cardizem 5 mg gttHe has been NPO awaiting PORSHA/DCCV. 39 y/o Upper sorbian Speaking M with PMHx of paroxysmal afib (not compliant with medications), HTN, DM2 intermittently noncompliant w/ metformin), alcohol abuse who presents to [facility] ED complaining of palpitations and lightheadedness that started around 11 pm last night. Pt states symptoms were similar to prior afib episodes. Pt reports drinking about 8 beers yesterday and on average 6 beers daily; he denies smoking cigarettes or drug use. Upon arrival to ED, pt found to be in atrial fibrillation RVR with rates up to 160 bpm. At [facility], pt given Digoxin 500 mcg IV, diltiazem 30 mg po x 2, diltiazem 20 IV x 1, diltiazem 25 IV x 1, esmolol bolus 500 mcg IV, lopressor 50 mg po x 1, lopressor 2.5 IV x 1, lopressor 5 IV x 2 for acute rate control with still minimal improvement.     Upon arrival to [facility], pt in afib rvr with variable heart rate in 110-150 bpm range. He was started on cardizem gtt and lopressor 50 mg q 6 hrs. He then underwent successful PORSHA/DCCV. Pt could not afford cost of DOAC therefore warfarin bridge started.     INCOMPLETE    39 y/o Luxembourgish Speaking M with PMHx of paroxysmal afib (not compliant with medications), HTN, DM2 intermittently noncompliant w/ metformin), alcohol abuse who presents to [facility] ED complaining of palpitations and lightheadedness that started around 11 pm last night. Pt states symptoms were similar to prior afib episodes. Pt reports drinking about 8 beers yesterday and on average 6 beers daily; he denies smoking cigarettes or drug use. Upon arrival to ED, pt found to be in atrial fibrillation RVR with rates up to 160 bpm. At [facility], pt given Digoxin 500 mcg IV, diltiazem 30 mg po x 2, diltiazem 20 IV x 1, diltiazem 25 IV x 1, esmolol bolus 500 mcg IV, lopressor 50 mg po x 1, lopressor 2.5 IV x 1, lopressor 5 IV x 2 for acute rate control with still minimal improvement.     Upon arrival to [facility], pt in afib rvr with variable heart rate in 110-150 bpm range. He was started on cardizem gtt and lopressor 50 mg q 6 hrs. He then underwent successful PORSHA/DCCV. Pt could not afford cost of DOAC therefore warfarin bridge started.     INCOMPLETE    37 y/o Hungarian Speaking M with PMHx of paroxysmal afib (not compliant with medications), HTN, DM2 intermittently noncompliant w/ metformin), alcohol abuse who presents to [facility] ED complaining of palpitations and lightheadedness that started around 11 pm last night. Pt states symptoms were similar to prior afib episodes. Pt reports drinking about 8 beers yesterday and on average 6 beers daily; he denies smoking cigarettes or drug use. Upon arrival to ED, pt found to be in atrial fibrillation RVR with rates up to 160 bpm. At [facility], pt given Digoxin 500 mcg IV, diltiazem 30 mg po x 2, diltiazem 20 IV x 1, diltiazem 25 IV x 1, esmolol bolus 500 mcg IV, lopressor 50 mg po x 1, lopressor 2.5 IV x 1, lopressor 5 IV x 2 for acute rate control with still minimal improvement.     Upon arrival to [facility], pt in afib rvr with variable heart rate in 110-150 bpm range. He was started on cardizem gtt and lopressor 50 mg q 6 hrs. He then underwent successful PORSHA/DCCV. Supplied 3- days of xarelto tablets from free samples.     37 y/o Slovak Speaking M with PMHx of paroxysmal afib (not compliant with medications), HTN, DM2 (intermittently noncompliant w/ metformin), alcohol abuse who presents to [facility] ED complaining of palpitations and lightheadedness that started around 11 pm last night. Pt states symptoms were similar to prior afib episodes. Pt reports drinking about 8 beers yesterday and on average 6 beers daily; he denies smoking cigarettes or drug use. Upon arrival to [facility] (2/8/24), EKG revealing afib rvr with variable heart rate in 110-150 bpm range. CXR unremarkable. He was started on cardizem gtt and lopressor 50 mg q 6 hrs. He then underwent successful PORSHA/DCCV.     Pt seen and examined at bedside this AM without acute complaints or events overnight. VSS, telemetry, labs reviewed and unremarkable. Pt stable for discharge as discussed with Dr. Chamorro. Supplied 30 days of Xarelto 20 mg tablets from free trial - CHADVASC score 1. Pt advised to follow up with Dr. Calderon in 1 week and Dr. Hernandez in 1 month.    Discharge medications: Xarelto 20 mg qhs x 30 days, metoprolol succinate 50 mg PO qd PRN for palpitations, metFORMIN 1000 mg PO BID    Pt advised to decrease alcohol intake as it is a trigger for afib.        39 y/o Yoruba Speaking M with PMHx of paroxysmal afib (not compliant with medications), HTN, DM2 (intermittently noncompliant w/ metformin), alcohol abuse (6-8 beers per day) who presents to [facility] ED complaining of palpitations and lightheadedness c/w prior afib episodes. Upon arrival to [facility] (2/8/24), EKG revealing afib rvr with variable heart rate in 110-150 bpm range. CXR unremarkable. He was started on cardizem gtt and lopressor 50 mg q 6 hrs. He then underwent successful PORSHA/DCCV.     Pt seen and examined at bedside this AM without acute complaints or events overnight. VSS, telemetry, labs reviewed and unremarkable. Pt stable for discharge as discussed with Dr. Chamorro. Supplied 30 days of Xarelto 20 mg tablets from free trial - CHADVASC score 1. Pt advised to follow up with Dr. Calderon in 1 week and Dr. Hernandez in 1 month.    Discharge medications: Xarelto 20 mg qhs x 30 days, metoprolol succinate 50 mg PO qd PRN for palpitations, metFORMIN 1000 mg PO BID    Pt advised to decrease alcohol intake as it is a trigger for onset of afib.       37 y/o Occitan Speaking M with PMHx of paroxysmal afib (not compliant with medications), HTN, DM2 (intermittently noncompliant w/ metformin), alcohol abuse (6-8 beers per day) who presented to [facility] ED (2/8/24) complaining of palpitations and lightheadedness c/w prior afib episodes and was transferred to [facility]. Upon arrival to [facility] (2/9/24), EKG revealing afib rvr with variable heart rate in 110-150 bpm range. CXR unremarkable. He was started on cardizem gtt and lopressor 50 mg q 6 hrs. Pt was admitted to cardiac tele floor for further w/u of afib. Pt now s/p successful PORSHA/DCCV.     Pt seen and examined at bedside this AM without acute complaints or events overnight. Pt remains in NSR. VSS, telemetry, labs reviewed and unremarkable. Pt stable for discharge as discussed with Dr. Chamorro. Supplied 30 days of Xarelto 20 mg tablets from free trial - CHADVASC score 1. Pt advised to follow up with Dr. Calderon in 1 week and Dr. Hernandez in 1 month.    Discharge medications: Xarelto 20 mg qhs x 30 days, metoprolol succinate 50 mg PO qd PRN for palpitations, metFORMIN 1000 mg PO BID    Pt advised to decrease alcohol intake as it is a trigger for onset of afib.

## 2024-02-09 NOTE — DISCHARGE NOTE PROVIDER - PROVIDER TOKENS
PROVIDER:[TOKEN:[[id number]],FOLLOWUP:[1 month]],PROVIDER:[TOKEN:[[id number]],FOLLOWUP:[1 week]] PROVIDER:[TOKEN:[[id number]],FOLLOWUP:[1 month]],FREE:[LAST:[Kvng],FIRST:[Brennen],PHONE:[phone]],FAX:[(   [phone]    -],ADDRESS:[[address] [place] [address], [address]],FOLLOWUP:[1 week]]

## 2024-02-09 NOTE — ED ADULT NURSE REASSESSMENT NOTE - NS ED NURSE REASSESS COMMENT FT1
Pt received from [facility] Castro. pt resting in stretcher, remains on CM. currently on esmolol infusion being titrated as per order  still remains tachy, rpt ekg being performed

## 2024-02-09 NOTE — H&P ADULT - HISTORY OF PRESENT ILLNESS
39 y/o Turkmen Speaking M with PMHx of paroxysmal afib (not compliant with medications), HTN, DM2 intermittently noncompliant w/ metformin), alcohol abuse who presents to [facility] ED complaining of palpitations and lightheadedness that started around 11 pm last night. Pt states symptoms were similar to prior afib episodes. Pt denies CP, SOB, orthopnea, PND, syncope, n/v, weakness or any other sx. Pt reports drinking about 8 beers yesterday and on average 6 beers daily; he denies smoking cigarettes or drug use. Upon arrival to ED, pt found to be in atrial fibrillation RVR with rates up to 160 bpm. At [facility], pt given Digoxin 500 mcg IV, diltiazem 30 mg po x 2, diltiazem 20 IV x 1, diltiazem 25 IV x 1, esmolol bolus 500 mcg IV, lopressor 50 mg po x 1, lopressor 2.5 IV x 1, lopressor 5 IV x 2 for acute rate control with still minimal improvement. Upon transfer to [facility], pt in afib rvr with variable heart rate in 110-150 bpm range. He has been NPO awaiting PORSHA/DCCV.     Pt was diagnosed with atrial fibrillation years ago, last episode he was hospitalized at [facility] for rapid afib (7/2022), started on cardizem gtt and dig loaded. Spontaneously converted at that time however previously did have three hospitalizations with multiple cardioversions in past. Pt did have TTE revealing normal LVSF (EF 55-60%) and NST wnl. Pt was d/c on warfarin therapy as he did not have víctor insurance but did not continue on discharge. Pt lost to follow up >1 year.    Previous cardiac studies:  7/18/22: Post-stress resting myocardial perfusion gated SPECT imaging was performed (LVEF = 64 %) with no regional wall motion abnormalities.  7/17/22: Normal LVSF (EF 55-60%), trace MR, normal LA & RA, normal RVSF, trace TR, trace OH  12/31/2020: LVEF 50%, mild LVH, normal RVSF, PASP 27, no significant VHD    [facility] ED  VS: /69, -150 bpm, RR 16, spO2 99%, T 98.5 F  RVP negative  Labs: WBC  6.22, Hgb 16.9, HCT 50.2, Plt  161, Trop I  21.3->24.0, Mg 2, TSH  1.637, Na 139, K 3.5, BUN 9, Cr  0.82,  Glu 369  AST 33, ALT 45, Alk phos 148, pro-BNP 25 37 y/o Italian Speaking M with PMHx of paroxysmal afib (not compliant with medications), HTN, DM2 intermittently noncompliant w/ metformin), alcohol abuse who presents to [facility] ED complaining of palpitations and lightheadedness that started around 11 pm last night. Pt states symptoms were similar to prior afib episodes. Pt denies CP, SOB, orthopnea, PND, syncope, n/v, weakness or any other sx. Pt reports drinking about 8 beers yesterday and on average 6 beers daily; he denies smoking cigarettes or drug use. Upon arrival to ED, pt found to be in atrial fibrillation RVR with rates up to 160 bpm. At [facility], pt given Digoxin 500 mcg IV, diltiazem 30 mg po x 2, diltiazem 20 IV x 1, diltiazem 25 IV x 1, esmolol bolus 500 mcg IV, lopressor 50 mg po x 1, lopressor 2.5 IV x 1, lopressor 5 IV x 2 for acute rate control with still minimal improvement. Upon transfer to [facility], pt in afib rvr with variable heart rate in 110-150 bpm range. He has been NPO awaiting PORSHA/DCCV.     Pt was diagnosed with atrial fibrillation years ago, last episode he was hospitalized at [facility] for rapid afib (7/2022), started on cardizem gtt and dig loaded. Spontaneously converted at that time however previously did have three hospitalizations with multiple cardioversions in past. Pt did have TTE revealing normal LVSF (EF 55-60%) and NST wnl. Pt was d/c on warfarin therapy as he did not have víctor insurance but did not continue on discharge. Pt lost to follow up >1 year.    Previous cardiac studies:  7/18/22: Post-stress resting myocardial perfusion gated SPECT imaging was performed (LVEF = 64 %) with no regional wall motion abnormalities.  7/17/22: Normal LVSF (EF 55-60%), trace MR, normal LA & RA, normal RVSF, trace TR, trace FL  12/31/2020: LVEF 50%, mild LVH, normal RVSF, PASP 27, no significant VHD    [facility] ED  VS: /69, -150 bpm, RR 16, spO2 99%, T 98.5 F  RVP negative  EKG w/ rapid afib and ST abnormality  V1-V4  Labs: WBC  6.22, Hgb 16.9, HCT 50.2, Plt  161, Trop I  21.3->24.0, Mg 2, TSH  1.637, Na 139, K 3.5, BUN 9, Cr  0.82,  Glu 369  AST 33, ALT 45, Alk phos 148, pro-BNP 25

## 2024-02-09 NOTE — ED ADULT NURSE NOTE - NS PRO PASSIVE SMOKE EXP
September 17, 2020    Roxanna Stark  [address]  [place] [address]    Dear ,    We are writing to inform you of your test results.    Normal Electrolytes   Kidney test stable     Resulted Orders   BASIC METABOLIC PANEL   Result Value Ref Range    Sodium 140 133 - 144 mmol/L    Potassium 4.2 3.4 - 5.3 mmol/L    Chloride 107 94 - 109 mmol/L    Carbon Dioxide 29 20 - 32 mmol/L    Anion Gap 4 3 - 14 mmol/L    Glucose 102 (H) 70 - 99 mg/dL    Urea Nitrogen 28 7 - 30 mg/dL    Creatinine 1.24 (H) 0.52 - 1.04 mg/dL    GFR Estimate 37 (L) >60 mL/min/[1.73_m2]      Comment:      Non  GFR Calc  Starting 12/18/2018, serum creatinine based estimated GFR (eGFR) will be   calculated using the Chronic Kidney Disease Epidemiology Collaboration   (CKD-EPI) equation.      GFR Estimate If Black 43 (L) >60 mL/min/[1.73_m2]      Comment:       GFR Calc  Starting 12/18/2018, serum creatinine based estimated GFR (eGFR) will be   calculated using the Chronic Kidney Disease Epidemiology Collaboration   (CKD-EPI) equation.      Calcium 9.2 8.5 - 10.1 mg/dL   If you have any questions or concerns, please call the clinic at the number listed above.       Sincerely,        Swapna Gaines MD/johnny                
Unknown

## 2024-02-09 NOTE — ED ADULT NURSE NOTE - OBJECTIVE STATEMENT
Pt reports palpitations that started while he was working tonight. Pt reports he drank 5-6 beers. Denies chest pain or shortness of breath. Pt reports hx of arrythmia. Pt alert and oriented x3, ambulatory, respirations even and unlabored. Pt placed on telemetry, bp and pulse oximetry monitoring. Pt evaluated using .

## 2024-02-09 NOTE — ED PROVIDER NOTE - CLINICAL SUMMARY MEDICAL DECISION MAKING FREE TEXT BOX
38M history of paroxysmal Afib (non-compliant with medications), DM2 (intermittently compliant with metformin)  Pw 1-2 hr of palpitations and lightheadedness. Denies chest pain, shortness of breath, fevers/chills, bowel/bladder habit changes, recent drug use. Yesterday patient drank 8 beers and today drank 6 beers; denies any concern for alcohol use disorder.     PE: A&Ox3, well appearing, NAD, NCAT, regular respiratory effort ctab, irregularly irregular rhythm with tachycardic rate, moving all four extremities equally.     MDM: Patient with history of afib noncompliant with medications here with afib RVR. Denies chest pain, shortness of breath. Will obtain labs and attempt rate control with medications. Pt will likely need admission for further management.

## 2024-02-09 NOTE — ED PROVIDER NOTE - OBJECTIVE STATEMENT
38M history of paroxysmal Afib (non-compliant with medications), DM2 (intermittently compliant with metformin)  Pw 1-2 hr of palpitations and lightheadedness. Denies chest pain, shortness of breath, fevers/chills, bowel/bladder habit changes, recent drug use. Yesterday patient drank 8 beers and today drank 6 beers; denies any concern for alcohol use disorder.

## 2024-02-09 NOTE — CONSULT NOTE ADULT - ASSESSMENT
39 y/o Swedish Speaking M with PMHx of paroxysmal afib (not compliant with medications), HTN, DM2 intermittently noncompliant w/ metformin), alcohol abuse who presents to [facility] ED complaining of palpitations and lightheadedness that started around 11 pm last night. Patient is in atrial fibrillation ( history of poor complains with meds and follow up, + ETOH abuse ) , would start diltiazem gtt, if patient is still in atrial fibrillation would proceed with PORSHA/DCCV.

## 2024-02-09 NOTE — ED PROVIDER NOTE - PROGRESS NOTE DETAILS
Patient with moderate improvement in HR (130-150). Will give 0.35mg/kg dose and reassess. Patient HR now 100-120s. Will give PO diltiazem and reassess. Symptomatically improved. Patient HR fluctuating from 110-150s. SBP 90s. Will likely need diltiazem gtt and admission. Patient given 5mg IV metoprolol to try and attain rate control. After reviewing the chart, patient was transitioned to metoprolol during his prior hospitalization off of a diltiazem drip, so maybe this will have a better effect on the rate. BP stable. Patient given 2nd dose of 5mg IV metoprolol as HR not consistently below 110 bpm. BP stable. No chest pain, dizziness, palpitations. Patient HR not controlled with IVP rate control meds. Will start patient on esmolol gtt for safety of fast on/off properties. Spoke with Dr Griffin (CCU attending). Recommends dc'ing esmolol and giving further PO metoprolol and cardizem with IV digoxin. To admit to cardiac tele under Dr. Calderon. Patient HR fluctuating from 110-150s. SBP 90s. Considered etoh withdrawal, but CIWA 0 and patient states that he drinks 6 beers 3-4 times a week and has never had withdrawal symptoms in the past. Spoke with Dr Griffin (CCU attending). Recommends dc'ing esmolol and giving further PO metoprolol and cardizem with IV digoxin and by the time the patient makes it to [facility], he will be rate controlled. Will admit to cardiac tele under Dr. Calderon.

## 2024-02-09 NOTE — PATIENT PROFILE ADULT - FALL HARM RISK - UNIVERSAL INTERVENTIONS
Bed in lowest position, wheels locked, appropriate side rails in place/Call bell, personal items and telephone in reach/Instruct patient to call for assistance before getting out of bed or chair/Non-slip footwear when patient is out of bed/[place] to call system/Physically safe environment - no spills, clutter or unnecessary equipment/Purposeful Proactive Rounding/Room/bathroom lighting operational, light cord in reach

## 2024-02-09 NOTE — ED ADULT NURSE NOTE - SUICIDE SCREENING QUESTION 2
[address]  [address] [facility] [address]  Phone: [phone]  Fax: [phone]    Rita Hughes MD        February 24, 2023     Patient: Laurita Fisher   YOB: 1999   Date of Visit: 2/24/2023       To Whom It May Concern: It is my medical opinion that Kaley Silva should work form home through 04/01/2023. If you have any questions or concerns, please don't hesitate to call.     Sincerely,        MD Kelle Delgado MD No

## 2024-02-09 NOTE — DISCHARGE NOTE PROVIDER - NSDCCPCAREPLAN_GEN_ALL_CORE_FT
PRINCIPAL DISCHARGE DIAGNOSIS  Diagnosis: Atrial fibrillation with rapid ventricular response  Assessment and Plan of Treatment: You have an abnormal heart rhythm (arrhythmia) called atrial fibrillation. With this condition, the hearts 2 upper chambers (the atria) quiver rather than squeeze the blood out in a normal pattern. This leads to an irregular and sometimes rapid heartbeat. Atrial fibrillation is serious condition as it affects the heart’s ability to fill with blood as it should and blood clots may form, which increases the risk for stroke.  -Please START XARELTO 20 mg ONCE A DAY AT BEDTIME to prevent a stroke.  -Please START METOPROLOL SUCCINATE 50 mg ONCE A DAY to keep your heart rate regular  -It is important to decrease your alcohol consumption, as  -Please follow up with cardiologist Dr. Calderon in 1-2 weeks.  -Please follow up with electrophysiologist Dr. Hernandez within 1 month.      SECONDARY DISCHARGE DIAGNOSES  Diagnosis: Hypertension  Assessment and Plan of Treatment: Please continue metoprolol succinate 50 mg once a day as listed to keep your blood pressure controlled. For blood pressure that is too high or too low please see your doctor or go to the emergency room as necessary. Please follow up with Dr. Calderon.    Diagnosis: Type 2 diabetes mellitus  Assessment and Plan of Treatment: Your Hemoglobin A1c is 11.4% and your goal A1c is less than 7.0%. This number measures your average blood sugar level over the last three months.  Please continue metformin 1000 mg twice a day as listed for diabetes. Maintain a low carbohydrate, low sugar diet, exercise, monitor your fingerstick blood sugars regarly and follow up with your Endocrinologist/Primary Care Doctor.    Diagnosis: Alcohol abuse  Assessment and Plan of Treatment: Please decrease your alcohol use from 6-8 beers daily to <2 drinks per day, and no more than 14 drinks per week. Alcohol use is linked to increased episodes of atrial fibrillation.     PRINCIPAL DISCHARGE DIAGNOSIS  Diagnosis: Atrial fibrillation with rapid ventricular response  Assessment and Plan of Treatment: You have an abnormal heart rhythm (arrhythmia) called atrial fibrillation. With this condition, the hearts 2 upper chambers (the atria) quiver rather than squeeze the blood out in a normal pattern. This leads to an irregular and sometimes rapid heartbeat. Atrial fibrillation is serious condition as it affects the heart’s ability to fill with blood as it should and blood clots may form, which increases the risk for stroke.  -Please START XARELTO 20 mg ONCE A DAY AT BEDTIME to prevent a stroke.  -Please START METOPROLOL SUCCINATE 50 mg one dose as needed if you feel like your heart rate is going too fast and gives you palpitations, which feel like fluttering in your chest. You do not need to take this medication every day. If this happens, please call Dr. Calderon.  -It is important to decrease your alcohol consumption, as it is a cause of atrial fibrillation.  -Please follow up with cardiologist Dr. Calderon in 1-2 weeks.  -Please follow up with electrophysiologist Dr. Hernandez within 1 month      SECONDARY DISCHARGE DIAGNOSES  Diagnosis: Type 2 diabetes mellitus  Assessment and Plan of Treatment: Your Hemoglobin A1c is 11.4% and your goal A1c is less than 7.0%. This number measures your average blood sugar level over the last three months.  Please continue metformin 1000 mg twice a day as listed for diabetes. Maintain a low carbohydrate, low sugar diet, exercise, monitor your fingerstick blood sugars regarly and follow up with your Endocrinologist/Primary Care Doctor.

## 2024-02-09 NOTE — H&P ADULT - NSHPLABSRESULTS_GEN_ALL_CORE
LABS:                          16.9   6.22  )-----------( 161      ( 09 Feb 2024 00:19 )             50.2     02-09    139  |  99  |  9   ----------------------------<  369<H>  3.5   |  24  |  0.82    Ca    10.6<H>      09 Feb 2024 00:19  Mg     2.0     02-09    TPro  7.9  /  Alb  3.7  /  TBili  0.4  /  DBili  x   /  AST  33  /  ALT  45<H>  /  AlkPhos  148<H>  02-09    LIVER FUNCTIONS - ( 09 Feb 2024 00:19 )  Alb: 3.7 g/dL / Pro: 7.9 g/dL / ALK PHOS: 148 U/L / ALT: 45 U/L / AST: 33 U/L / GGT: x           PT/INR - ( 09 Feb 2024 01:04 )   PT: 10.0 sec;   INR: 0.88          PTT - ( 09 Feb 2024 01:04 )  PTT:30.7 sec  Urinalysis Basic - ( 09 Feb 2024 00:19 )    Color: x / Appearance: x / SG: x / pH: x  Gluc: 369 mg/dL / Ketone: x  / Bili: x / Urobili: x   Blood: x / Protein: x / Nitrite: x   Leuk Esterase: x / RBC: x / WBC x   Sq Epi: x / Non Sq Epi: x / Bacteria: x

## 2024-02-09 NOTE — H&P ADULT - PROBLEM SELECTOR PLAN 1
-Currently variable rapid -150 bpm despite multiple attempts at rate control  -S/p digoxin 500 mcg IV, diltiazem 30 mg po x 2, diltiazem 20 IV x 1, diltiazem 25 IV x 1, esmolol bolus 500 mcg IV, lopressor 50 mg po x 1, lopressor 2.5 IV x 1, lopressor 5 IV x 2 at [facility]  -Rate control: Will trial another dose of cardizem 20 mg IV x 1, if responsive may start cardizem gtt as pt has required this on previous admission for acute rate control  -AC: C/w eliquis 5 mg BID (will price check and verify DOAC affordable). CHADSVASC 2 points  -Plan: Keep NPO for PORSHA/DCCV  - -Currently variable rapid -150 bpm despite multiple attempts at rate control  -S/p digoxin 500 mcg IV, diltiazem 30 mg po x 2, diltiazem 20 IV x 1, diltiazem 25 IV x 1, esmolol bolus 500 mcg IV, lopressor 50 mg po x 1, lopressor 2.5 IV x 1, lopressor 5 IV x 2 at [facility]  -Rate control: Lopressor 50 mg po q 6hrs & start cardizem gtt @ 5 mg/hr will titrate appropriately to ensure HR <120  -TSH 1.637; Outpt sleep study r/o ROSE  -Bedside TTE EF appears wnl; awaiting full TTE. Previous TTE 2022 (EF 55-60%)   -Avoid excessive alcohol use  -AC: C/w eliquis 5 mg BID (will price check and verify DOAC affordable). CHADSVASC 2 points  -Plan: Consult EP; Keep NPO for PORSHA/DCCV -Currently variable rapid -150 bpm despite multiple attempts at rate control  -S/p digoxin 500 mcg IV, diltiazem 30 mg po x 2, diltiazem 20 IV x 1, diltiazem 25 IV x 1, esmolol bolus 500 mcg IV, lopressor 50 mg po x 1, lopressor 2.5 IV x 1, lopressor 5 IV x 2 at [facility]  -Rate control: Lopressor 50 mg po q 6hrs & start cardizem gtt @ 5 mg/hr will titrate appropriately to ensure HR <120  -TSH 1.637; Outpt sleep study r/o ROSE  -EKG: Rapid atrial fibrillation w/ ST abnormality in precordial leads (similar to previous). Trop I neg x2. NST wnl 2022.  -Bedside TTE EF appears wnl; awaiting full TTE. Previous TTE 2022 (EF 55-60%)   -Avoid excessive alcohol use  -AC: C/w eliquis 5 mg BID (will price check and verify DOAC affordable). CHADSVASC 2 points  -Plan: Consult EP; Keep NPO for PORSHA/DCCV

## 2024-02-09 NOTE — H&P ADULT - NSHPADDITIONALINFOADULT_GEN_ALL_CORE
Attending Attestation:  I was physically present for the key portions of the evaluation and management (E/M) service provided.  I agree with the above history, physical, and plan which I have reviewed with the following edits/addendum:    38M w parox AFib on his 3rd hospitalization for AF w RVR, poor outpt FU who p/w palpitations < 24 h found ot be in AFib w difficult to control RVR  - ECG 2/8 nonischemic, afib rvr  - CXR non congested  - on exam, irreg irreg rhythm, tachy, normal lung sounds, no JVD, no edema    #Afib w RVR  - rate control w oral BB and cardizem gtt  - EP consult for definitive mgt. Pt verbalized understanding of importance of compliance with AC post PORSHA-CVN  - recommend etoh moderation  - betablocker/CCB on DC    Brennen Calderon MD  Cardiology     via phone translation services  used during initial encounter 2/9

## 2024-02-09 NOTE — ED ADULT NURSE NOTE - NSFALLUNIVINTERV_ED_ALL_ED
Bed/Stretcher in lowest position, wheels locked, appropriate side rails in place/Call bell, personal items and telephone in reach/Instruct patient to call for assistance before getting out of bed/chair/stretcher/Non-slip footwear applied when patient is off stretcher/[place] to call system/Physically safe environment - no spills, clutter or unnecessary equipment/Purposeful proactive rounding/Room/bathroom lighting operational, light cord in reach

## 2024-02-09 NOTE — ED ADULT NURSE REASSESSMENT NOTE - NS ED NURSE REASSESS COMMENT FT1
Dr. Obrien called to bedside as esmolol drip has reached 200mcg/kg/min via titration. Dr. Obrien verbal order to go back to starting dose of 50mcg/kg/min until he can follow up with pharmacy about bolus dosing. Drip returned to rate requested. Pt remains resting comfortable on stretcher, respirations even and unlabored. Telemetry, bp and pulse oximeter monitoring continue.

## 2024-02-09 NOTE — ED PROVIDER NOTE - PHYSICAL EXAMINATION
PE: A&Ox3, well appearing, NAD, NCAT, regular respiratory effort ctab, irregularly irregular rhythm with tachycardic rate, moving all four extremities equally. PE: A&Ox3, well appearing, NAD, NCAT, regular respiratory effort ctab, irregularly irregular rhythm with tachycardic rate, moving all four extremities equally. CIWA 0.

## 2024-02-09 NOTE — DISCHARGE NOTE PROVIDER - CARE PROVIDER_API CALL
Chino Hernandez  Cardiac Electrophysiology  [address] [address]  Phone: [phone]  Fax: [phone]  Follow Up Time: 1 month    Brennen Calderon  Cardiology  [address] [place] [address], [address]  [place] [address]  Phone: [phone]  Fax: [phone]  Follow Up Time: 1 week   Chino Hernandez  Cardiac Electrophysiology  [address] [address]  Phone: [phone]  Fax: [phone]  Follow Up Time: 1 month    Brennen Calderon  [address] [place], [address]  Phone: [phone]  Fax: (   )    -  Follow Up Time: 1 week

## 2024-02-09 NOTE — CONSULT NOTE ADULT - SUBJECTIVE AND OBJECTIVE BOX
Electrophysiology Consult Note:     CHIEF COMPLAINT:  Patient is a 38y old  Male who presents with a chief complaint of Rapid atrial fibrillation (2024 08:09)        HISTORY OF PRESENT ILLNESS:   HPI:  39 y/o Green[place]ic Speaking M with PMHx of paroxysmal afib (not compliant with medications), HTN, DM2 intermittently noncompliant w/ metformin), alcohol abuse who presents to [facility] ED complaining of palpitations and lightheadedness that started around 11 pm last night. Pt states symptoms were similar to prior afib episodes. Pt denies CP, SOB, orthopnea, PND, syncope, n/v, weakness or any other sx. Pt reports drinking about 8 beers yesterday and on average 6 beers daily; he denies smoking cigarettes or drug use. Upon arrival to ED, pt found to be in atrial fibrillation RVR with rates up to 160 bpm. At [facility], pt given Digoxin 500 mcg IV, diltiazem 30 mg po x 2, diltiazem 20 IV x 1, diltiazem 25 IV x 1, esmolol bolus 500 mcg IV, lopressor 50 mg po x 1, lopressor 2.5 IV x 1, lopressor 5 IV x 2 for acute rate control with still minimal improvement. Upon transfer to [facility], pt in afib rvr with variable heart rate in 110-150 bpm range. He has been NPO awaiting PORSHA/DCCV.     Pt was diagnosed with atrial fibrillation years ago, last episode he was hospitalized at [facility] for rapid afib (2022), started on cardizem gtt and dig loaded. Spontaneously converted at that time however previously did have three hospitalizations with multiple cardioversions in past. Pt did have TTE revealing normal LVSF (EF 55-60%) and NST wnl. Pt was d/c on warfarin therapy as he did not have víctor insurance but did not continue on discharge. Pt lost to follow up >1 year.    EPS called to evaluate for atrial fibrillation.     PAST MEDICAL & SURGICAL HISTORY:  Essential hypertension      Atrial fibrillation      Diabetes mellitus      No significant past surgical history          FAMILY HISTORY:      SOCIAL HISTORY:    [ ] Smoker  [x ] Alcohol      Allergies    No Known Allergies    Intolerances    	    MEDICATIONS:  MEDICATIONS  (STANDING):  apixaban 5 milliGRAM(s) Oral every 12 hours  dextrose 5%. 1000 milliLiter(s) (50 mL/Hr) IV Continuous <Continuous>  dextrose 5%. 1000 milliLiter(s) (100 mL/Hr) IV Continuous <Continuous>  dextrose 50% Injectable 25 Gram(s) IV Push once  dextrose 50% Injectable 12.5 Gram(s) IV Push once  dextrose 50% Injectable 25 Gram(s) IV Push once  diltiazem Infusion 5 mG/Hr (5 mL/Hr) IV Continuous <Continuous>  glucagon  Injectable 1 milliGRAM(s) IntraMuscular once  insulin lispro (ADMELOG) corrective regimen sliding scale   SubCutaneous Before meals and at bedtime  metoprolol tartrate 50 milliGRAM(s) Oral every 6 hours  potassium chloride    Tablet ER 40 milliEquivalent(s) Oral every 4 hours    MEDICATIONS  (PRN):  dextrose Oral Gel 15 Gram(s) Oral once PRN Blood Glucose LESS THAN 70 milliGRAM(s)/deciliter        REVIEW OF SYSTEMS:  CONSTITUTIONAL: No fever, weight loss, or fatigue  EYES: No eye pain, visual disturbances, or discharge  ENMT:  No difficulty hearing, tinnitus, vertigo; No sinus or throat pain  NECK: No pain or stiffness  BREASTS: No pain, masses, or nipple discharge  RESPIRATORY: No cough, wheezing, chills or hemoptysis; No Shortness of Breath  CARDIOVASCULAR: No chest pain, palpitations, dizziness, or leg swelling  GASTROINTESTINAL: No abdominal or epigastric pain. No nausea, vomiting, or hematemesis; No diarrhea or constipation.      PHYSICAL EXAM:  Vital Signs Last 24 Hrs  T(C): 36.8 (2024 10:18), Max: 37 (2024 23:59)  T(F): 98.3 (2024 10:18), Max: 98.6 (2024 23:59)  HR: 132 (2024 08:42) (87 - 188)  BP: 126/76 (2024 08:42) (96/59 - 145/87)  BP(mean): 92 (2024 08:42) (83 - 92)  RR: 18 (2024 08:42) (14 - 18)  SpO2: 99% (2024 08:42) (95% - 99%)    Parameters below as of 2024 08:42  Patient On (Oxygen Delivery Method): room air      Daily Height in cm: 172.72 (2024 06:43)    Daily Weight in k (2024 06:43)    Constitutional: NAD	  HEENT:   PERRL, EOMI	  CVS: S1 S2, No JVD,  No edema  Pulm: Lungs clear to auscultation	  GI:  Soft, Non-tender, + BS	  Ext: No LE edema  Neurologic: A&O x 3  Skin: No rash or lesion       	  LABS:	                         16.9   6.22  )-----------( 161      ( 2024 00:19 )             50.2         139  |  99  |  9   ----------------------------<  369<H>  3.5   |  24  |  0.82    Ca    10.6<H>      2024 00:19  Mg     2.0         TPro  7.9  /  Alb  3.7  /  TBili  0.4  /  DBili  x   /  AST  33  /  ALT  45<H>  /  AlkPhos  148<H>      proBNP:   Lipid Profile:   HgA1c:   TSH: Thyroid Stimulating Hormone, Serum: 1.637 uIU/mL ( @ 01:04)  	    Telemetry: atrial fibrillation     Echo: < from: TTE Echo Complete w/ Contrast w/o Doppler (24 @ 09:23) >  1. Patient was tachycardic during the study.   2. Normal left ventricular size and systolic function.   3. Normal right ventricular size and systolic function.   4. No significant valvular disease.   5. Trivial pericardial effusion.   6. Compared to the previous TTE performed on 2020, there have been   no significant interval changes.

## 2024-02-09 NOTE — H&P ADULT - ASSESSMENT
39 y/o Romansh Speaking M w/ h/o paroxysmal afib (not compliant with medications), HTN, DM2, alcohol abuse (6-8 beers daily) presents for symptomatic rapid atrial fibrillation with RVR. Pt remains in rapid afib despite multiple attempts at rate control. Plan for EP consult, NPO for PORSHA/DCCV.

## 2024-02-09 NOTE — DISCHARGE NOTE PROVIDER - NSDCMRMEDTOKEN_GEN_ALL_CORE_FT
Eliquis 5 mg oral tablet: 1 tab(s) orally 2 times a day  metFORMIN 1000 mg oral tablet: 1 tab(s) orally 2 times a day   Eliquis 5 mg oral tablet: 1 tab(s) orally 2 times a day  metFORMIN 1000 mg oral tablet: 1 tab(s) orally 2 times a day  Xarelto 20 mg oral tablet: 1 tab(s) orally once a day (at bedtime)   metFORMIN 1000 mg oral tablet: 1 tab(s) orally 2 times a day  metoprolol succinate 50 mg oral tablet, extended release: 1 tab(s) orally once a day as needed for  palpitations Please only take one dose of this medication if your heart feels like it is fluttering, then call your cardiologist.  Xarelto 20 mg oral tablet: 1 tab(s) orally once a day (at bedtime)

## 2024-02-09 NOTE — DISCHARGE NOTE PROVIDER - ATTENDING DISCHARGE PHYSICAL EXAMINATION:
Admitted with third episode of symptomatic atrial fibrillation, likely from ETOH abuse. Now s.p PORSHA DCCV. 30-day supply of xarelto provided, follow up as an outpatient.

## 2024-02-09 NOTE — ED PROVIDER NOTE - CRITICAL CARE ATTENDING CONTRIBUTION TO CARE
The patient was seen immediately upon arrival due to a high probability of imminent or life-threatening deterioration secondary to atrial fibrillation with rapid ventricular rate, which required my direct attention, intervention, and personal management at the bedside. I have personally provided critical care time exclusive of time spent on separately billable procedures. Time includes review of laboratory data, radiology results, discussion with consultants, discussion with the patient's family, and monitoring for potential decompensation.

## 2024-02-10 ENCOUNTER — TRANSCRIPTION ENCOUNTER (OUTPATIENT)
Age: 39
End: 2024-02-10

## 2024-02-10 VITALS
HEART RATE: 88 BPM | DIASTOLIC BLOOD PRESSURE: 71 MMHG | SYSTOLIC BLOOD PRESSURE: 121 MMHG | OXYGEN SATURATION: 99 % | RESPIRATION RATE: 17 BRPM

## 2024-02-10 LAB
A1C WITH ESTIMATED AVERAGE GLUCOSE RESULT: 11.4 % — HIGH (ref 4–5.6)
ALBUMIN SERPL ELPH-MCNC: 3.4 G/DL — SIGNIFICANT CHANGE UP (ref 3.3–5)
ALP SERPL-CCNC: 102 U/L — SIGNIFICANT CHANGE UP (ref 40–120)
ALT FLD-CCNC: 27 U/L — SIGNIFICANT CHANGE UP (ref 10–45)
ANION GAP SERPL CALC-SCNC: 9 MMOL/L — SIGNIFICANT CHANGE UP (ref 5–17)
APTT BLD: 29.8 SEC — SIGNIFICANT CHANGE UP (ref 24.5–35.6)
AST SERPL-CCNC: 23 U/L — SIGNIFICANT CHANGE UP (ref 10–40)
BILIRUB SERPL-MCNC: 0.6 MG/DL — SIGNIFICANT CHANGE UP (ref 0.2–1.2)
BUN SERPL-MCNC: 21 MG/DL — SIGNIFICANT CHANGE UP (ref 7–23)
CALCIUM SERPL-MCNC: 8.9 MG/DL — SIGNIFICANT CHANGE UP (ref 8.4–10.5)
CHLORIDE SERPL-SCNC: 101 MMOL/L — SIGNIFICANT CHANGE UP (ref 96–108)
CHOLEST SERPL-MCNC: 240 MG/DL — HIGH
CO2 SERPL-SCNC: 27 MMOL/L — SIGNIFICANT CHANGE UP (ref 22–31)
CREAT SERPL-MCNC: 0.85 MG/DL — SIGNIFICANT CHANGE UP (ref 0.5–1.3)
EGFR: 114 ML/MIN/1.73M2 — SIGNIFICANT CHANGE UP
ESTIMATED AVERAGE GLUCOSE: 280 MG/DL — HIGH (ref 68–114)
GLUCOSE BLDC GLUCOMTR-MCNC: 180 MG/DL — HIGH (ref 70–99)
GLUCOSE BLDC GLUCOMTR-MCNC: 245 MG/DL — HIGH (ref 70–99)
GLUCOSE SERPL-MCNC: 189 MG/DL — HIGH (ref 70–99)
HCT VFR BLD CALC: 47.2 % — SIGNIFICANT CHANGE UP (ref 39–50)
HDLC SERPL-MCNC: 52 MG/DL — SIGNIFICANT CHANGE UP
HGB BLD-MCNC: 15.7 G/DL — SIGNIFICANT CHANGE UP (ref 13–17)
INR BLD: 0.92 — SIGNIFICANT CHANGE UP (ref 0.85–1.18)
LIPID PNL WITH DIRECT LDL SERPL: 162 MG/DL — HIGH
MAGNESIUM SERPL-MCNC: 2 MG/DL — SIGNIFICANT CHANGE UP (ref 1.6–2.6)
MCHC RBC-ENTMCNC: 30.5 PG — SIGNIFICANT CHANGE UP (ref 27–34)
MCHC RBC-ENTMCNC: 33.3 GM/DL — SIGNIFICANT CHANGE UP (ref 32–36)
MCV RBC AUTO: 91.7 FL — SIGNIFICANT CHANGE UP (ref 80–100)
NON HDL CHOLESTEROL: 188 MG/DL — HIGH
NRBC # BLD: 0 /100 WBCS — SIGNIFICANT CHANGE UP (ref 0–0)
PLATELET # BLD AUTO: 137 K/UL — LOW (ref 150–400)
POTASSIUM SERPL-MCNC: 4.6 MMOL/L — SIGNIFICANT CHANGE UP (ref 3.5–5.3)
POTASSIUM SERPL-SCNC: 4.6 MMOL/L — SIGNIFICANT CHANGE UP (ref 3.5–5.3)
PROT SERPL-MCNC: 6.3 G/DL — SIGNIFICANT CHANGE UP (ref 6–8.3)
PROTHROM AB SERPL-ACNC: 10.5 SEC — SIGNIFICANT CHANGE UP (ref 9.5–13)
RBC # BLD: 5.15 M/UL — SIGNIFICANT CHANGE UP (ref 4.2–5.8)
RBC # FLD: 12.6 % — SIGNIFICANT CHANGE UP (ref 10.3–14.5)
SODIUM SERPL-SCNC: 137 MMOL/L — SIGNIFICANT CHANGE UP (ref 135–145)
TRIGL SERPL-MCNC: 129 MG/DL — SIGNIFICANT CHANGE UP
WBC # BLD: 7.95 K/UL — SIGNIFICANT CHANGE UP (ref 3.8–10.5)
WBC # FLD AUTO: 7.95 K/UL — SIGNIFICANT CHANGE UP (ref 3.8–10.5)

## 2024-02-10 PROCEDURE — 83880 ASSAY OF NATRIURETIC PEPTIDE: CPT

## 2024-02-10 PROCEDURE — 85610 PROTHROMBIN TIME: CPT

## 2024-02-10 PROCEDURE — 84484 ASSAY OF TROPONIN QUANT: CPT

## 2024-02-10 PROCEDURE — 93312 ECHO TRANSESOPHAGEAL: CPT

## 2024-02-10 PROCEDURE — 87637 SARSCOV2&INF A&B&RSV AMP PRB: CPT

## 2024-02-10 PROCEDURE — 80061 LIPID PANEL: CPT

## 2024-02-10 PROCEDURE — 82962 GLUCOSE BLOOD TEST: CPT

## 2024-02-10 PROCEDURE — 84443 ASSAY THYROID STIM HORMONE: CPT

## 2024-02-10 PROCEDURE — 96374 THER/PROPH/DIAG INJ IV PUSH: CPT

## 2024-02-10 PROCEDURE — 99291 CRITICAL CARE FIRST HOUR: CPT | Mod: 25

## 2024-02-10 PROCEDURE — C8923: CPT

## 2024-02-10 PROCEDURE — 96375 TX/PRO/DX INJ NEW DRUG ADDON: CPT

## 2024-02-10 PROCEDURE — 83036 HEMOGLOBIN GLYCOSYLATED A1C: CPT

## 2024-02-10 PROCEDURE — 85025 COMPLETE CBC W/AUTO DIFF WBC: CPT

## 2024-02-10 PROCEDURE — 86703 HIV-1/HIV-2 1 RESULT ANTBDY: CPT

## 2024-02-10 PROCEDURE — 71045 X-RAY EXAM CHEST 1 VIEW: CPT

## 2024-02-10 PROCEDURE — 83690 ASSAY OF LIPASE: CPT

## 2024-02-10 PROCEDURE — T1013: CPT

## 2024-02-10 PROCEDURE — 99239 HOSP IP/OBS DSCHRG MGMT >30: CPT

## 2024-02-10 PROCEDURE — 83735 ASSAY OF MAGNESIUM: CPT

## 2024-02-10 PROCEDURE — 85027 COMPLETE CBC AUTOMATED: CPT

## 2024-02-10 PROCEDURE — 85730 THROMBOPLASTIN TIME PARTIAL: CPT

## 2024-02-10 PROCEDURE — 36415 COLL VENOUS BLD VENIPUNCTURE: CPT

## 2024-02-10 PROCEDURE — 80053 COMPREHEN METABOLIC PANEL: CPT

## 2024-02-10 RX ORDER — RIVAROXABAN 15 MG-20MG
1 KIT ORAL
Qty: 30 | Refills: 0
Start: 2024-02-10 | End: 2024-03-10

## 2024-02-10 RX ORDER — ENOXAPARIN SODIUM 100 MG/ML
120 INJECTION SUBCUTANEOUS ONCE
Refills: 0 | Status: COMPLETED | OUTPATIENT
Start: 2024-02-10 | End: 2024-02-10

## 2024-02-10 RX ORDER — METOPROLOL TARTRATE 50 MG
1 TABLET ORAL
Qty: 30 | Refills: 2
Start: 2024-02-10 | End: 2024-05-09

## 2024-02-10 RX ADMIN — Medication 50 MILLIGRAM(S): at 05:26

## 2024-02-10 RX ADMIN — Medication 4: at 11:18

## 2024-02-10 RX ADMIN — Medication 2: at 06:52

## 2024-02-10 RX ADMIN — ENOXAPARIN SODIUM 120 MILLIGRAM(S): 100 INJECTION SUBCUTANEOUS at 11:16

## 2024-02-10 NOTE — DISCHARGE NOTE NURSING/CASE MANAGEMENT/SOCIAL WORK - PATIENT PORTAL LINK FT
You can access the FollowMyHealth Patient Portal offered by [facility] by registering at the following website: [email or web address]. By joining [facility]’s FollowMyHealth portal, you will also be able to view your health information using other applications (apps) compatible with our system.

## 2024-02-10 NOTE — PROGRESS NOTE ADULT - NS ATTEND AMEND GEN_ALL_CORE FT
38M w parox AFib on his 3rd hospitalization for AF w RVR, poor outpt FU who p/w palpitations < 24 h found ot be in AFib w difficult to control RVR  - ECG 2/8 nonischemic, afib rvr  - CXR non congested  - on exam, irreg irreg rhythm, tachy, normal lung sounds, no JVD, no edema    #Afib w RVR  - rate control w oral BB and cardizem gtt  - EP consult for definitive mgt. Pt verbalized understanding of importance of compliance with AC post PORSHA-CVN  - recommend etoh cessation  CHADVASVASC score 1, DM.  S.p DCCV. 30 DAYS OF AC with xarelto, provided enough samples for 30 days.  - betablocker/CCB on DC

## 2024-02-10 NOTE — DISCHARGE NOTE NURSING/CASE MANAGEMENT/SOCIAL WORK - NSDCPEXARELTOREACT_GEN_ALL_CORE
Seen and discharged by [facility]        Tim Thompson RN  11/08/18 4305 Rivaroxaban/Xarelto increases your risk for bleeding. Notify your doctor if you experience any of the following side effects: unusual bleeding or bruising, vomiting blood or coffee ground-like material, red or black stool, itching or hives, chest tightness, trouble breathing, swelling in your face or hands, swelling in your mouth or throat, change in how much or how often you urinate, red or brown urine, heavy menstrual or vaginal bleeding, or blistering or peeling skin. When Rivaroxaban/Xarelto is taken with other medicines, they can affect how it works. Taking other medications such as aspirin, antibiotics, antifungals, blood thinners, nonsteroidal anti-inflammatories, and medications that treat depression can increase your risk of bleeding. It is very important to tell your health care provider about all of the other medicines, including over-the-counter medications, herbs, and vitamins you are taking.  DO NOT start, stop, or change the dosage of any medicine, including over-the-counter medicines, vitamins, and herbal products without your doctor’s approval.  Any products containing aspirin or are nonsteroidal anti-inflammatories lessen the blood’s ability to form clots and adds to the effect of Rivaroxaban/Xarelto. Never take aspirin or medicines that contain aspirin without speaking to your doctor.

## 2024-02-23 DIAGNOSIS — T51.91XA TOXIC EFFECT OF UNSPECIFIED ALCOHOL, ACCIDENTAL (UNINTENTIONAL), INITIAL ENCOUNTER: ICD-10-CM

## 2024-02-23 DIAGNOSIS — I48.0 PAROXYSMAL ATRIAL FIBRILLATION: ICD-10-CM

## 2024-02-23 DIAGNOSIS — E11.65 TYPE 2 DIABETES MELLITUS WITH HYPERGLYCEMIA: ICD-10-CM

## 2024-02-23 DIAGNOSIS — Z91.199 PATIENT'S NONCOMPLIANCE WITH OTHER MEDICAL TREATMENT AND REGIMEN DUE TO UNSPECIFIED REASON: ICD-10-CM

## 2024-02-23 DIAGNOSIS — I10 ESSENTIAL (PRIMARY) HYPERTENSION: ICD-10-CM

## 2024-02-23 DIAGNOSIS — F10.10 ALCOHOL ABUSE, UNCOMPLICATED: ICD-10-CM

## 2024-02-23 DIAGNOSIS — Z79.84 LONG TERM (CURRENT) USE OF ORAL HYPOGLYCEMIC DRUGS: ICD-10-CM

## 2024-02-23 DIAGNOSIS — Z79.01 LONG TERM (CURRENT) USE OF ANTICOAGULANTS: ICD-10-CM

## 2024-02-29 ENCOUNTER — APPOINTMENT (OUTPATIENT)
Dept: HEART AND VASCULAR | Facility: CLINIC | Age: 39
End: 2024-02-29

## 2024-03-05 RX ORDER — METFORMIN HYDROCHLORIDE 850 MG/1
1 TABLET ORAL
Qty: 0 | Refills: 0 | DISCHARGE

## 2024-03-05 RX ORDER — LISINOPRIL 2.5 MG/1
1 TABLET ORAL
Qty: 0 | Refills: 0 | DISCHARGE

## 2024-03-05 RX ORDER — WARFARIN SODIUM 2.5 MG/1
1 TABLET ORAL
Qty: 0 | Refills: 0 | DISCHARGE

## 2024-03-05 RX ORDER — CARVEDILOL PHOSPHATE 80 MG/1
1 CAPSULE, EXTENDED RELEASE ORAL
Qty: 0 | Refills: 0 | DISCHARGE

## 2024-03-05 RX ORDER — ATORVASTATIN CALCIUM 80 MG/1
1 TABLET, FILM COATED ORAL
Qty: 0 | Refills: 0 | DISCHARGE
